# Patient Record
Sex: FEMALE | Race: WHITE | Employment: UNEMPLOYED | URBAN - METROPOLITAN AREA
[De-identification: names, ages, dates, MRNs, and addresses within clinical notes are randomized per-mention and may not be internally consistent; named-entity substitution may affect disease eponyms.]

---

## 2018-06-27 DIAGNOSIS — Z12.39 BREAST SCREENING: Primary | ICD-10-CM

## 2018-09-21 DIAGNOSIS — Z12.39 BREAST SCREENING: ICD-10-CM

## 2018-09-21 NOTE — LETTER
[address] [place] [address] [address]  [place], [facility]  Phone: [phone]  Fax: [phone]    Dear Vera Jeffries,    The results of the following test(s) you had done  are as follows and requires follow up as indicated.         Within Normal Limits   Further Action     Annual Follow Up      As Directed         Pap Smear:     []         []        Mammogram:             [x]          []   Endometrial Biopsy:              []                                            []   Cervical Biopsy:                     []                                            []   Dexascan:                               []                                            []                    Other:            Instructions for further action:         Even if findings are within normal limits now, it is important to continue annual visits with your doctor for regular screenings and exam.    Thank You,     Dr. Karen Rachel

## 2022-05-16 ENCOUNTER — APPOINTMENT (OUTPATIENT)
Dept: CT IMAGING | Age: 57
End: 2022-05-16
Payer: COMMERCIAL

## 2022-05-16 ENCOUNTER — HOSPITAL ENCOUNTER (EMERGENCY)
Age: 57
Discharge: HOME | End: 2022-05-16
Attending: EMERGENCY MEDICINE
Payer: COMMERCIAL

## 2022-05-16 VITALS
TEMPERATURE: 98.2 F | DIASTOLIC BLOOD PRESSURE: 89 MMHG | HEART RATE: 87 BPM | OXYGEN SATURATION: 99 % | BODY MASS INDEX: 28.35 KG/M2 | HEIGHT: 63 IN | SYSTOLIC BLOOD PRESSURE: 121 MMHG | WEIGHT: 160 LBS | RESPIRATION RATE: 15 BRPM

## 2022-05-16 DIAGNOSIS — M54.2 NECK PAIN: Primary | ICD-10-CM

## 2022-05-16 PROCEDURE — 72125 CT NECK SPINE W/O DYE: CPT

## 2022-05-16 PROCEDURE — 72131 CT LUMBAR SPINE W/O DYE: CPT

## 2022-05-16 PROCEDURE — 99284 EMERGENCY DEPT VISIT MOD MDM: CPT

## 2022-05-16 PROCEDURE — 6370000000 HC RX 637 (ALT 250 FOR IP): Performed by: PHYSICIAN ASSISTANT

## 2022-05-16 RX ORDER — CYCLOBENZAPRINE HCL 10 MG
10 TABLET ORAL 2 TIMES DAILY PRN
Qty: 14 TABLET | Refills: 0 | Status: SHIPPED | OUTPATIENT
Start: 2022-05-16 | End: 2022-05-23

## 2022-05-16 RX ORDER — TRAMADOL HYDROCHLORIDE 50 MG/1
50 TABLET ORAL EVERY 8 HOURS PRN
Qty: 9 TABLET | Refills: 0 | Status: SHIPPED | OUTPATIENT
Start: 2022-05-16 | End: 2022-05-19

## 2022-05-16 RX ORDER — CYCLOBENZAPRINE HCL 10 MG
10 TABLET ORAL ONCE
Status: COMPLETED | OUTPATIENT
Start: 2022-05-16 | End: 2022-05-16

## 2022-05-16 RX ORDER — PREDNISONE 20 MG/1
20 TABLET ORAL ONCE
Status: DISCONTINUED | OUTPATIENT
Start: 2022-05-16 | End: 2022-05-16

## 2022-05-16 RX ORDER — TRAMADOL HYDROCHLORIDE 50 MG/1
50 TABLET ORAL ONCE
Status: COMPLETED | OUTPATIENT
Start: 2022-05-16 | End: 2022-05-16

## 2022-05-16 RX ADMIN — CYCLOBENZAPRINE 10 MG: 10 TABLET, FILM COATED ORAL at 15:08

## 2022-05-16 RX ADMIN — TRAMADOL HYDROCHLORIDE 50 MG: 50 TABLET, COATED ORAL at 15:08

## 2022-05-16 ASSESSMENT — ENCOUNTER SYMPTOMS
BOWEL INCONTINENCE: 0
BACK PAIN: 1

## 2022-05-16 ASSESSMENT — PAIN SCALES - GENERAL: PAINLEVEL_OUTOF10: 7

## 2022-05-16 ASSESSMENT — PAIN - FUNCTIONAL ASSESSMENT: PAIN_FUNCTIONAL_ASSESSMENT: 0-10

## 2022-05-16 NOTE — ED NOTES
Pt to er with c/o neck and back pain. Pt states she started PT 2 months ago for neck pain and numbness to right hand/arm. Pt states they used traction at therapy and made her pain worse. Pt a&ox3. Skin warm and dry. Respirations even and non-labored.       Rufino Klinefelter, RN  05/16/22 4417

## 2022-05-16 NOTE — ED PROVIDER NOTES
eMERGENCY dEPARTMENT eNCOUnter   [address] Name: Pietro Aguiar  MRN: [id number]  [place] 1965  Date of evaluation: 5/16/22     Pietro Aguiar is a 65 y.o. female with CC: Back Pain (Hx of lumbar fusions; reports she started PT a few months ago for arm numbness; pt reports since started PT, pain in arm, back, and neck pain worsening ) and Neck Pain        This visit was performed by both a physician and an APC. I performed all aspects of the MDM as documented.       The care is provided during an unprecedented national emergency due to the novel coronavirus, Luis Chanel MD  Attending Emergency Physician           Lurdes Talley MD  05/16/22 7269

## 2022-05-16 NOTE — ED PROVIDER NOTES
for weakness. All other systems reviewed and are negative. 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[facility] [address]     Fibroid uterus     Graves disease     Menorrhagia        SURGICAL HISTORY       Past Surgical History:   Procedure Laterality Date     SECTION, LOW TRANSVERSE      HC  PICC POWERPIC SINGLE  2016         HYSTERECTOMY, TOTAL ABDOMINAL  6/28/10    TOTAL THYROIDECTOMY  2015       CURRENT MEDICATIONS       Previous Medications    ALPRAZOLAM (XANAX) 0.5 MG TABLET    Take 0.5 mg by mouth nightly as needed for Sleep    ASCORBIC ACID (VITAMIN C) 500 MG TABLET    Take 500 mg by mouth daily    LEVOTHYROXINE SODIUM (TIROSINT) 125 MCG CAPS    Take by mouth Daily    LIOTHYRONINE (CYTOMEL) 5 MCG TABLET    Take 5 mcg by mouth daily    MULTIPLE VITAMINS-MINERALS (MULTIVITAMIN PO)    Take by mouth       ALLERGIES     is allergic to codeine and other. FAMILY HISTORY     She indicated that the status of her mother is unknown. She indicated that the status of her father is unknown. SOCIAL HISTORY      reports that she has quit smoking. She has a 20.00 pack-year smoking history. She has never used smokeless tobacco. She reports current alcohol use. She reports that she does not use drugs. PHYSICAL EXAM     INITIAL VITALS: /89   Pulse 87   Temp 98.2 °F (36.8 °C)   Resp 15   Ht 5' 3\" (1.6 m)   Wt 160 lb (72.6 kg)   SpO2 99%   BMI 28.34 kg/m²      Physical Exam  Vitals and nursing note reviewed. Constitutional:       Appearance: She is well-developed. HENT:      Head: Normocephalic and atraumatic. Eyes:      Pupils: Pupils are equal, round, and reactive to light. Cardiovascular:      Rate and Rhythm: Normal rate and regular rhythm. Heart sounds: Normal heart sounds. No murmur heard. Pulmonary:      Effort: Pulmonary effort is normal.      Breath sounds: Normal breath sounds.    Abdominal:      General: Bowel sounds are normal.      Palpations: Abdomen is soft. Tenderness: There is no abdominal tenderness. Musculoskeletal:         General: Normal range of motion. Cervical back: Normal range of motion. Skin:     General: Skin is warm and dry. Neurological:      General: No focal deficit present. Mental Status: She is alert and oriented to person, place, and time. Cranial Nerves: No cranial nerve deficit. Sensory: No sensory deficit. Motor: No weakness. Coordination: Coordination normal.      Gait: Gait normal.      Deep Tendon Reflexes: Reflexes normal.         MEDICAL DECISION MAKING:     There are chronic changes noted on the cervical spine CT. Chronic changes noted on the lumbar spine CT. offered steroids but she states that she does not tolerate these very well. Recommend ice or heat to affected area close follow-up with neurosurgery of your choice will provide the name for Dr. Harden Apgar for further evaluation and treatment. If symptoms worsen please return to the emergency department. DIAGNOSTIC RESULTS     EKG: All EKG's are interpreted by the Emergency Department Physician who either signs or Co-signs this chart in the absence of acardiologist.        RADIOLOGY:[place]plain film, CT, MRI, and formal ultrasound images (except ED bedside ultrasound) are read by the radiologist and the images and interpretations are directly viewed by the emergency physician. LABS:All lab results were reviewed by myself, and all abnormals are listed below.   Labs Reviewed - No data to display      EMERGENCY DEPARTMENT COURSE:   Vitals:    Vitals:    22 1405   BP: 121/89   Pulse: 87   Resp: 15   Temp: 98.2 °F (36.8 °C)   SpO2: 99%   Weight: 160 lb (72.6 kg)   Height: 5' 3\" (1.6 m)       The patient was given the following medications while in the emergency department:  Orders Placed This Encounter   Medications    traMADol (ULTRAM) tablet 50 mg    DISCONTD: predniSONE (DELTASONE) tablet 20 mg    cyclobenzaprine (FLEXERIL) tablet 10 mg    traMADol (ULTRAM) 50 MG tablet     Sig: Take 1 tablet by mouth every 8 hours as needed for Pain for up to 3 days. Intended supply: 3 days. Take lowest dose possible to manage pain     Dispense:  9 tablet     Refill:  0    cyclobenzaprine (FLEXERIL) 10 MG tablet     Sig: Take 1 tablet by mouth 2 times daily as needed for Muscle spasms     Dispense:  14 tablet     Refill:  0       -------------------------      CRITICAL CARE:    CONSULTS:  None    PROCEDURES:  Procedures    FINAL IMPRESSION      1. Neck pain          DISPOSITION/PLAN   DISPOSITION Decision To Discharge 05/16/2022 05:03:33 PM      PATIENT REFERREDTO:  Leopold Havens, MD  [address] [facility]  [phone]    Schedule an appointment as soon as possible for a visit in 2 days      [facility] ED  [address] [facility]  [phone]    If symptoms worsen      DISCHARGEMEDICATIONS:  New Prescriptions    CYCLOBENZAPRINE (FLEXERIL) 10 MG TABLET    Take 1 tablet by mouth 2 times daily as needed for Muscle spasms    TRAMADOL (ULTRAM) 50 MG TABLET    Take 1 tablet by mouth every 8 hours as needed for Pain for up to 3 days. Intended supply: 3 days.  Take lowest dose possible to manage pain       (Please note that portions of this note were completed with a voice recognition program.  Efforts were made to edit thedictations but occasionally words are mis-transcribed.)    TRACEY Aragon PA-C  05/16/22 4503

## 2022-05-18 ENCOUNTER — TELEPHONE (OUTPATIENT)
Dept: ADMINISTRATIVE | Age: 57
End: 2022-05-18

## 2022-05-18 NOTE — TELEPHONE ENCOUNTER
The Pt called requesting to see Dr Kevin Medina. She was in the hospital for back issues on Mon. 5/16/22-[facility]. She also has a referral from her PCP. The Pt is requesting a Call Back this afternoon. (If possible) The Pt can be reached at [phone].

## 2022-05-23 ENCOUNTER — TELEPHONE (OUTPATIENT)
Dept: PAIN MANAGEMENT | Age: 57
End: 2022-05-23

## 2022-05-23 ENCOUNTER — INITIAL CONSULT (OUTPATIENT)
Dept: PAIN MANAGEMENT | Age: 57
End: 2022-05-23
Payer: COMMERCIAL

## 2022-05-23 VITALS — WEIGHT: 178.6 LBS | BODY MASS INDEX: 31.64 KG/M2 | HEIGHT: 63 IN

## 2022-05-23 DIAGNOSIS — M54.12 CERVICAL RADICULOPATHY: ICD-10-CM

## 2022-05-23 DIAGNOSIS — R93.7 ABNORMAL MRI, CERVICAL SPINE: ICD-10-CM

## 2022-05-23 DIAGNOSIS — M96.1 POSTLAMINECTOMY SYNDROME OF LUMBAR REGION: Primary | ICD-10-CM

## 2022-05-23 DIAGNOSIS — G89.29 OTHER CHRONIC PAIN: ICD-10-CM

## 2022-05-23 PROCEDURE — 1036F TOBACCO NON-USER: CPT | Performed by: PAIN MEDICINE

## 2022-05-23 PROCEDURE — G8427 DOCREV CUR MEDS BY ELIG CLIN: HCPCS | Performed by: PAIN MEDICINE

## 2022-05-23 PROCEDURE — 3017F COLORECTAL CA SCREEN DOC REV: CPT | Performed by: PAIN MEDICINE

## 2022-05-23 PROCEDURE — G8417 CALC BMI ABV UP PARAM F/U: HCPCS | Performed by: PAIN MEDICINE

## 2022-05-23 PROCEDURE — 99204 OFFICE O/P NEW MOD 45 MIN: CPT | Performed by: PAIN MEDICINE

## 2022-05-23 RX ORDER — LEVOTHYROXINE, LIOTHYRONINE 57; 13.5 UG/1; UG/1
TABLET ORAL
COMMUNITY
Start: 2022-03-26

## 2022-05-23 RX ORDER — PANCRELIPASE 30000; 6000; 19000 [USP'U]/1; [USP'U]/1; [USP'U]/1
CAPSULE, DELAYED RELEASE PELLETS ORAL
COMMUNITY
Start: 2022-04-24

## 2022-05-23 RX ORDER — METOPROLOL SUCCINATE 25 MG/1
TABLET, EXTENDED RELEASE ORAL
COMMUNITY
Start: 2022-02-15

## 2022-05-23 RX ORDER — PROGESTERONE 100 MG/1
CAPSULE ORAL DAILY
COMMUNITY

## 2022-05-23 ASSESSMENT — ENCOUNTER SYMPTOMS
BOWEL INCONTINENCE: 0
BACK PAIN: 1

## 2022-05-23 NOTE — PROGRESS NOTES
HPI:     Back Pain  This is a chronic problem. The current episode started more than 1 year ago. The problem occurs intermittently. The problem has been gradually worsening since onset. The pain is present in the lumbar spine. The quality of the pain is described as aching, burning, cramping, shooting and stabbing. The pain does not radiate. The pain is at a severity of 6/10. The symptoms are aggravated by bending, twisting and sitting. Associated symptoms include bladder incontinence, headaches and numbness. Pertinent negatives include no bowel incontinence, tingling or weakness. Risk factors include history of cancer (Adenoid cystic carcinoma). Treatments tried: pt, surgery. Neck Pain   This is a chronic problem. The current episode started more than 1 year ago. The problem occurs constantly. The problem has been gradually worsening. The pain is associated with nothing. The pain is present in the left side and occipital region. The quality of the pain is described as stabbing and aching. The pain is at a severity of 10/10. The symptoms are aggravated by twisting, bending, coughing, sneezing and swallowing. Associated symptoms include headaches and numbness. Pertinent negatives include no tingling or weakness. Chronic neck and low back pain. Pain has been getting significantly worse lately. She has done physical therapy which she feels made things much worse. Pain has gotten so severe that she went to the emergency department last week. CT of the lumbar spine shows previous lumbar fusion and degenerative changes. CT of the cervical spine shows multilevel degenerative changes. Complaining of worsening subjective weakness in the right arm. OARRS reviewed, medical marijuana, unsure of benefit. Chart review shows a history of etoh abuse. Patient denies any new neurological symptoms. No bowel or bladder incontinence, no weakness, and no falling.     Review of OARRS does not show any aberrant prescription behavior. 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[facility] [address]     Fibroid uterus     Graves disease     Menorrhagia        Past Surgical History:   Procedure Laterality Date     SECTION, LOW TRANSVERSE      HC  PICC POWERPIC SINGLE  2016         HYSTERECTOMY, TOTAL ABDOMINAL  6/28/10    TOTAL THYROIDECTOMY  2015       Allergies   Allergen Reactions    Codeine     Other      ALL NARCOTICS    Penicillin G Other (See Comments)         Current Outpatient Medications:     NP THYROID 90 MG tablet, , Disp: , Rfl:     metoprolol succinate (TOPROL XL) 25 MG extended release tablet, , Disp: , Rfl:     CREON 6000-44006 units delayed release capsule, , Disp: , Rfl:     progesterone (PROMETRIUM) 100 MG CAPS capsule, Take by mouth daily, Disp: , Rfl:     cyclobenzaprine (FLEXERIL) 10 MG tablet, Take 1 tablet by mouth 2 times daily as needed for Muscle spasms, Disp: 14 tablet, Rfl: 0    Levothyroxine Sodium (TIROSINT) 125 MCG CAPS, Take by mouth Daily, Disp: , Rfl:     liothyronine (CYTOMEL) 5 MCG tablet, Take 5 mcg by mouth daily, Disp: , Rfl:     Multiple Vitamins-Minerals (MULTIVITAMIN PO), Take by mouth, Disp: , Rfl:     Ascorbic Acid (VITAMIN C) 500 MG tablet, Take 500 mg by mouth daily, Disp: , Rfl:     ALPRAZolam (XANAX) 0.5 MG tablet, Take 0.5 mg by mouth nightly as needed for Sleep, Disp: , Rfl:     Family History   Problem Relation Age of Onset    Schizophrenia Mother     Heart Disease Father        Social History     Socioeconomic History    Marital status:      Spouse name: Not on file    Number of children: Not on file    Years of education: Not on file    Highest education level: Not on file   Occupational History    Not on file   Tobacco Use    Smoking status: Former Smoker     Packs/day: 1.00     Years: 20.00     Pack years: 20.00    Smokeless tobacco: Never Used   Substance and Sexual Activity    Alcohol use:  Yes     Alcohol/week: 0.0 standard drinks     Comment: social    Drug use: No    Sexual activity: Yes   Other Topics Concern    Not on file   Social History Narrative    Not on file     Social Determinants of Health     Financial Resource Strain:     Difficulty of Paying Living Expenses: Not on file   Food Insecurity:     Worried About Running Out of Food in the Last Year: Not on file    Kari of Food in the Last Year: Not on file   Transportation Needs:     Lack of Transportation (Medical): Not on file    Lack of Transportation (Non-Medical): Not on file   Physical Activity:     Days of Exercise per Week: Not on file    Minutes of Exercise per Session: Not on file   Stress:     Feeling of Stress : Not on file   Social Connections:     Frequency of Communication with Friends and Family: Not on file    Frequency of Social Gatherings with Friends and Family: Not on file    Attends [employer] Services: Not on file    Active Member of [address] [place] [address] or Organizations: Not on file    Attends Club or Organization Meetings: Not on file    Marital Status: Not on file   Intimate Partner Violence:     Fear of Current or Ex-Partner: Not on file    Emotionally Abused: Not on file    Physically Abused: Not on file    Sexually Abused: Not on file   Housing Stability:     Unable to Pay for Housing in the Last Year: Not on file    Number of [place] in the Last Year: Not on file    Unstable Housing in the Last Year: Not on file       Review of Systems:  Review of Systems   Musculoskeletal: Positive for back pain and neck pain. Gastrointestinal: Negative for bowel incontinence. Genitourinary: Positive for bladder incontinence. Neurological: Positive for headaches and numbness. Negative for tingling and weakness. Physical Exam:  Ht 5' 3\" (1.6 m)   Wt 178 lb 9.6 oz (81 kg)   BMI 31.64 kg/m²     Physical Exam  Constitutional:       Appearance: Normal appearance.    Pulmonary:      Effort: Pulmonary effort is normal. Neurological:      Mental Status: She is alert. Psychiatric:         Attention and Perception: Attention and perception normal.         Mood and Affect: Mood and affect normal.         Record/Diagnostics Review:    As above, I did independently review the imaging      Assessment:  1. Postlaminectomy syndrome of lumbar region    2. Cervical radiculopathy    3. Other chronic pain        Treatment Plan:  DISCUSSION: Treatment options discussed with patient and all questions answered to patient's satisfaction. OARRS Review: Reviewed and acceptable for medications prescribed. TREATMENT OPTIONS:     Discussed different treatment options including continued conservative care such as physical therapy, chiropractic care, acupuncture. Discussed different interventional options such as epidural steroids or medial branch blocks. Also discussed neuromodulation in the form of spinal cord stimulation. Also discussed surgical evaluation. Has failed conservative measures, including physical therapy and the pain is worsening, I do believe an MRI of the Cervical and Lumbar spine is indicated to further evaluate pathology and guide treatment plan. Consider injection therapies versus surgical evaluation based on imaging results. Chart review shows h/o etoh abuse, states she stopped drinking in January of this year. Mayank Bermudez M.D. I have reviewed the chief complaint and history of present illness (including ROS and PFSH) and vital documentation by my staff and I agree with their documentation and have added where applicable.

## 2022-05-23 NOTE — TELEPHONE ENCOUNTER
Pt called and stated she scheduled her MRI for June 2nd. Pt wanted to be seen the same day with provider for a follow up. Writer offered pt first available and pt stated she needs to be seen much sooner. Pt stated office told her she could be fit in for an appt after MRI. Writer unable to reach clinical staff. Please contact pt.

## 2022-06-03 ENCOUNTER — HOSPITAL ENCOUNTER (OUTPATIENT)
Dept: MRI IMAGING | Facility: CLINIC | Age: 57
Discharge: HOME | End: 2022-06-05
Payer: COMMERCIAL

## 2022-06-03 DIAGNOSIS — M96.1 POSTLAMINECTOMY SYNDROME OF LUMBAR REGION: ICD-10-CM

## 2022-06-03 DIAGNOSIS — M54.12 CERVICAL RADICULOPATHY: ICD-10-CM

## 2022-06-03 LAB — POC CREATININE: 0.6 MG/DL (ref 0.6–1.4)

## 2022-06-03 PROCEDURE — A9579 GAD-BASE MR CONTRAST NOS,1ML: HCPCS | Performed by: PAIN MEDICINE

## 2022-06-03 PROCEDURE — 72158 MRI LUMBAR SPINE W/O & W/DYE: CPT

## 2022-06-03 PROCEDURE — 6360000004 HC RX CONTRAST MEDICATION: Performed by: PAIN MEDICINE

## 2022-06-03 PROCEDURE — 82565 ASSAY OF CREATININE: CPT

## 2022-06-03 PROCEDURE — 72141 MRI NECK SPINE W/O DYE: CPT

## 2022-06-03 RX ADMIN — GADOTERIDOL 20 ML: 279.3 INJECTION, SOLUTION INTRAVENOUS at 11:47

## 2022-06-09 ENCOUNTER — OFFICE VISIT (OUTPATIENT)
Dept: PAIN MANAGEMENT | Age: 57
End: 2022-06-09
Payer: COMMERCIAL

## 2022-06-09 VITALS — BODY MASS INDEX: 28.87 KG/M2 | WEIGHT: 163 LBS

## 2022-06-09 DIAGNOSIS — M51.36 DDD (DEGENERATIVE DISC DISEASE), LUMBAR: Chronic | ICD-10-CM

## 2022-06-09 DIAGNOSIS — M50.30 DDD (DEGENERATIVE DISC DISEASE), CERVICAL: Chronic | ICD-10-CM

## 2022-06-09 DIAGNOSIS — M48.02 CERVICAL STENOSIS OF SPINE: ICD-10-CM

## 2022-06-09 PROCEDURE — 99213 OFFICE O/P EST LOW 20 MIN: CPT | Performed by: NURSE PRACTITIONER

## 2022-06-09 ASSESSMENT — ENCOUNTER SYMPTOMS
PHOTOPHOBIA: 0
TROUBLE SWALLOWING: 0
BACK PAIN: 1
BOWEL INCONTINENCE: 0
VISUAL CHANGE: 0
ABDOMINAL PAIN: 0

## 2022-06-09 NOTE — PROGRESS NOTES
Chief Complaint   Patient presents with    Back Pain    Neck Pain         [facility]  Pt complains of neck and back pain. Cervical MRI with multilevel degenerative changes with moderate to severe spinal canal stenosis at C5-6 and C6-7 and cord changes at C5-6. Complains of subjective weakness in right arm and numbness and tingling in right fingers. Lumbar MRI with multilevel degenerative changes as well. She uses medical marijuana for pain. History of alcohol abuse. Back Pain  This is a chronic problem. The current episode started more than 1 year ago. The problem occurs constantly. The problem has been gradually worsening since onset. The pain is present in the lumbar spine. The quality of the pain is described as aching and stabbing. The pain does not radiate. The pain is at a severity of 3/10. The pain is moderate. The pain is worse during the day. The symptoms are aggravated by bending, lying down, position, sitting, standing, stress and twisting. Associated symptoms include headaches, numbness, tingling and weakness. Pertinent negatives include no abdominal pain, bladder incontinence, bowel incontinence, chest pain, dysuria, fever, leg pain, paresis, paresthesias, pelvic pain, perianal numbness or weight loss. Risk factors include history of cancer. She has tried analgesics, NSAIDs, bed rest, heat, ice, walking, muscle relaxant and home exercises for the symptoms. The treatment provided mild relief. Neck Pain   This is a chronic problem. The current episode started more than 1 year ago. The problem occurs constantly. The problem has been gradually worsening. The pain is present in the left side. The quality of the pain is described as shooting, aching and stabbing. The pain is at a severity of 3/10. The pain is moderate. The symptoms are aggravated by bending, coughing, position, sneezing, stress, swallowing and twisting. The pain is same all the time. Stiffness is present in the morning.  Associated symptoms include headaches, numbness, tingling and weakness. Pertinent negatives include no chest pain, fever, leg pain, pain with swallowing, paresis, photophobia, syncope, trouble swallowing, visual change or weight loss. She has tried acetaminophen, bed rest, chiropractic manipulation, heat, home exercises, ice, muscle relaxants, neck support, NSAIDs and oral narcotics for the symptoms. The treatment provided mild relief. Patient denies any new neurological symptoms. No bowel or bladder incontinence, no weakness, and no falling.     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[address]     Fibroid uterus     Graves disease     Menorrhagia        Past Surgical History:   Procedure Laterality Date     SECTION, LOW TRANSVERSE      HC  PICC POWERPIC SINGLE  2016         TOTAL ABDOMINAL HYSTERECTOMY  6/28/10    TOTAL THYROIDECTOMY  2015       Allergies   Allergen Reactions    Codeine     Other      ALL NARCOTICS    Penicillin G Other (See Comments)         Current Outpatient Medications:     NP THYROID 90 MG tablet, , Disp: , Rfl:     metoprolol succinate (TOPROL XL) 25 MG extended release tablet, , Disp: , Rfl:     CREON 6000-04851 units delayed release capsule, , Disp: , Rfl:     progesterone (PROMETRIUM) 100 MG CAPS capsule, Take by mouth daily, Disp: , Rfl:     Levothyroxine Sodium (TIROSINT) 125 MCG CAPS, Take by mouth Daily, Disp: , Rfl:     liothyronine (CYTOMEL) 5 MCG tablet, Take 5 mcg by mouth daily, Disp: , Rfl:     Multiple Vitamins-Minerals (MULTIVITAMIN PO), Take by mouth, Disp: , Rfl:     Ascorbic Acid (VITAMIN C) 500 MG tablet, Take 500 mg by mouth daily, Disp: , Rfl:     ALPRAZolam (XANAX) 0.5 MG tablet, Take 0.5 mg by mouth nightly as needed for Sleep, Disp: , Rfl:     Family History   Problem Relation Age of Onset    Schizophrenia Mother     Heart Disease Father        Social History     Socioeconomic History    Marital status:      Spouse name: Not on file    Number of children: Not on file    Years of education: Not on file    Highest education level: Not on file   Occupational History    Not on file   Tobacco Use    Smoking status: Former Smoker     Packs/day: 1.00     Years: 20.00     Pack years: 20.00    Smokeless tobacco: Never Used   Substance and Sexual Activity    Alcohol use: Yes     Alcohol/week: 0.0 standard drinks     Comment: social    Drug use: No    Sexual activity: Yes   Other Topics Concern    Not on file   Social History Narrative    Not on file     Social Determinants of Health     Financial Resource Strain:     Difficulty of Paying Living Expenses: Not on file   Food Insecurity:     Worried About Running Out of Food in the Last Year: Not on file    Kari of Food in the Last Year: Not on file   Transportation Needs:     Lack of Transportation (Medical): Not on file    Lack of Transportation (Non-Medical): Not on file   Physical Activity:     Days of Exercise per Week: Not on file    Minutes of Exercise per Session: Not on file   Stress:     Feeling of Stress : Not on file   Social Connections:     Frequency of Communication with Friends and Family: Not on file    Frequency of Social Gatherings with Friends and Family: Not on file    Attends Holiness Services: Not on file    Active Member of [address] [place] [address] [employer] or Organizations: Not on file    Attends Club or Organization Meetings: Not on file    Marital Status: Not on file   Intimate Partner Violence:     Fear of Current or Ex-Partner: Not on file    Emotionally Abused: Not on file    Physically Abused: Not on file    Sexually Abused: Not on file   Housing Stability:     Unable to Pay for Housing in the Last Year: Not on file    Number of [place] in the Last Year: Not on file    Unstable Housing in the Last Year: Not on file       Review of Systems:  Review of Systems   Constitutional: Negative for fever and weight loss.    HENT: Negative for trouble swallowing. Eyes: Negative for photophobia. Cardiovascular: Negative for chest pain, palpitations and syncope. Musculoskeletal: Positive for back pain and neck pain. Gastrointestinal: Negative for abdominal pain and bowel incontinence. Genitourinary: Negative for bladder incontinence, dysuria and pelvic pain. Neurological: Positive for headaches, numbness, tingling and weakness. Negative for disturbances in coordination, loss of balance and paresthesias. Physical Exam:  Wt 163 lb (73.9 kg)   BMI 28.87 kg/m²     Physical Exam  HENT:      Head: Normocephalic. Pulmonary:      Effort: Pulmonary effort is normal.   Musculoskeletal:         General: Normal range of motion. Cervical back: Normal range of motion. Tenderness present. Lumbar back: Tenderness present. Back:    Skin:     General: Skin is warm and dry. Neurological:      Mental Status: She is alert and oriented to person, place, and time. Record/Diagnostics Review:    FINDINGS:   BONES/ALIGNMENT: There is straightening of the normal cervical lordosis.  No   significant listhesis.  Vertebral body heights are maintained.  There is   edema at C3 and C4.       SPINAL CORD: Faint T2 hyperintensity in the cervical cord at C5-6.       SOFT TISSUES: No paraspinal mass identified.       C2-C3: There is no significant disc protrusion, spinal canal stenosis or   neural foraminal narrowing.       C3-C4: Posterior disc osteophyte complex with moderate spinal canal stenosis.    Uncovertebral facet DJD with moderate bilateral neural foraminal stenosis.       C4-C5: Posterior disc osteophyte complex without significant spinal canal   stenosis.  Mild bilateral neural foraminal narrowing.       C5-C6: Posterior disc osteophyte complex and ligamentum flavum hypertrophy   with moderate to severe spinal canal stenosis.  Uncovertebral facet DJD with   moderate bilateral neural foraminal stenosis.       C6-C7: Posterior disc osteophyte complex and ligamentum flavum hypertrophy   with moderate to severe spinal canal stenosis.  Uncovertebral facet DJD with   severe left and moderate right neural foraminal stenosis.       C7-T1: Posterior disc osteophyte complex without significant spinal canal or   right neural foraminal stenosis.  Moderate left neural foraminal narrowing.           Impression   1. Multilevel degenerative change with moderate to severe spinal canal   stenosis at C5-6 and C6-7.  There is moderate spinal canal narrowing at C3-4.   2. Multilevel neural foraminal narrowing as above. 3. Edema at C3-4, which may represent Modic type 1 degenerative change. 4. T2 hyperintensity in the cervical cord at C5-6, which may represent   myelomalacia. 5. Straightening of the normal cervical lordosis. FINDINGS:   BONES/ALIGNMENT: There are 5 lumbar vertebral bodies.  There is   anterolisthesis at L3-4.  There is retrolisthesis at L2-3.  Vertebral body   heights are maintained.  Postoperative changes at L4-5 with associated   hardware artifact.  There is edema at L1-2 and T11 likely Modic type 1   degenerative change.       SPINAL CORD:  The conus terminates normally.       SOFT TISSUES: No abnormal enhancement is seen of the lumbar spine.  No   paraspinal mass identified.       L1-L2: There is no significant disc protrusion, spinal canal stenosis or   neural foraminal narrowing.       L2-L3: Disc protrusion and facet DJD with minimal spinal canal stenosis. Moderate bilateral neural foraminal stenosis.       L3-L4: Disc protrusion and facet DJD with mild spinal canal stenosis. Moderate bilateral neural foraminal stenosis.       L4-L5: Posterior decompression without significant spinal canal or neural   foraminal stenosis.       L5-S1: Disc protrusion and facet DJD with mild spinal canal stenosis. Moderate bilateral neural foraminal stenosis.         Impression   1.  Prior posterior decompression at L4-5 without significant spinal canal   stenosis. 2. Multilevel degenerative change with mild spinal canal narrowing at L2-3,   L3-4, and L5-S1.   3. Multilevel neural foraminal narrowing. 4. Anterolisthesis at L3-4 with retrolisthesis at L2-3. Assessment:  Problem List Items Addressed This Visit     DDD (degenerative disc disease), lumbar (Chronic)    DDD (degenerative disc disease), cervical (Chronic)    Cervical stenosis of spine             Treatment Plan:  MRIs reviewed with patient and her   Referral to Dr. Geena Raya already placed - copy given to patient  Follow up after she sees surgeon  HX of infection after back surgery requiring a PICC line  No longer following with ID       I have reviewed the chief complaint and history of present illness (including ROS and PFSH) and vital documentation by my staff and I agree with their documentation and have added where applicable.

## 2022-06-10 PROBLEM — M51.36 DDD (DEGENERATIVE DISC DISEASE), LUMBAR: Chronic | Status: ACTIVE | Noted: 2022-06-10

## 2022-06-10 PROBLEM — M50.30 DDD (DEGENERATIVE DISC DISEASE), CERVICAL: Chronic | Status: ACTIVE | Noted: 2022-06-10

## 2022-06-10 PROBLEM — M48.02 CERVICAL STENOSIS OF SPINE: Status: ACTIVE | Noted: 2022-06-10

## 2022-06-13 ENCOUNTER — HOSPITAL ENCOUNTER (EMERGENCY)
Age: 57
Discharge: HOME | End: 2022-06-13
Attending: EMERGENCY MEDICINE
Payer: COMMERCIAL

## 2022-06-13 VITALS
OXYGEN SATURATION: 97 % | DIASTOLIC BLOOD PRESSURE: 97 MMHG | TEMPERATURE: 98.4 F | SYSTOLIC BLOOD PRESSURE: 129 MMHG | RESPIRATION RATE: 16 BRPM | HEART RATE: 70 BPM

## 2022-06-13 DIAGNOSIS — M79.601 PARESTHESIA AND PAIN OF BOTH UPPER EXTREMITIES: Primary | ICD-10-CM

## 2022-06-13 DIAGNOSIS — R20.2 PARESTHESIA AND PAIN OF BOTH UPPER EXTREMITIES: Primary | ICD-10-CM

## 2022-06-13 DIAGNOSIS — M79.602 PARESTHESIA AND PAIN OF BOTH UPPER EXTREMITIES: Primary | ICD-10-CM

## 2022-06-13 LAB
ABSOLUTE EOS #: 0.19 K/UL (ref 0–0.44)
ABSOLUTE IMMATURE GRANULOCYTE: <0.03 K/UL (ref 0–0.3)
ABSOLUTE LYMPH #: 2.49 K/UL (ref 1.1–3.7)
ABSOLUTE MONO #: 0.68 K/UL (ref 0.1–1.2)
ANION GAP SERPL CALCULATED.3IONS-SCNC: 15 MMOL/L (ref 9–17)
BASOPHILS # BLD: 1 % (ref 0–2)
BASOPHILS ABSOLUTE: 0.05 K/UL (ref 0–0.2)
BUN BLDV-MCNC: 12 MG/DL (ref 6–20)
CALCIUM SERPL-MCNC: 9.9 MG/DL (ref 8.6–10.4)
CHLORIDE BLD-SCNC: 96 MMOL/L (ref 98–107)
CO2: 25 MMOL/L (ref 20–31)
CREAT SERPL-MCNC: 0.5 MG/DL (ref 0.5–0.9)
EOSINOPHILS RELATIVE PERCENT: 3 % (ref 1–4)
GFR AFRICAN AMERICAN: >60 ML/MIN
GFR NON-AFRICAN AMERICAN: >60 ML/MIN
GFR SERPL CREATININE-BSD FRML MDRD: ABNORMAL ML/MIN/{1.73_M2}
GLUCOSE BLD-MCNC: 118 MG/DL (ref 70–99)
HCT VFR BLD CALC: 43.4 % (ref 36.3–47.1)
HEMOGLOBIN: 14.7 G/DL (ref 11.9–15.1)
IMMATURE GRANULOCYTES: 0 %
LYMPHOCYTES # BLD: 36 % (ref 24–43)
MCH RBC QN AUTO: 31.2 PG (ref 25.2–33.5)
MCHC RBC AUTO-ENTMCNC: 33.9 G/DL (ref 28.4–34.8)
MCV RBC AUTO: 92.1 FL (ref 82.6–102.9)
MONOCYTES # BLD: 10 % (ref 3–12)
NRBC AUTOMATED: 0 PER 100 WBC
PDW BLD-RTO: 12.8 % (ref 11.8–14.4)
PLATELET # BLD: 254 K/UL (ref 138–453)
PMV BLD AUTO: 10.6 FL (ref 8.1–13.5)
POTASSIUM SERPL-SCNC: 4.4 MMOL/L (ref 3.7–5.3)
RBC # BLD: 4.71 M/UL (ref 3.95–5.11)
SEG NEUTROPHILS: 50 % (ref 36–65)
SEGMENTED NEUTROPHILS ABSOLUTE COUNT: 3.56 K/UL (ref 1.5–8.1)
SODIUM BLD-SCNC: 136 MMOL/L (ref 135–144)
TSH SERPL DL<=0.05 MIU/L-ACNC: 4.82 UIU/ML (ref 0.3–5)
WBC # BLD: 7 K/UL (ref 3.5–11.3)

## 2022-06-13 PROCEDURE — 93005 ELECTROCARDIOGRAM TRACING: CPT | Performed by: STUDENT IN AN ORGANIZED HEALTH CARE EDUCATION/TRAINING PROGRAM

## 2022-06-13 PROCEDURE — 99285 EMERGENCY DEPT VISIT HI MDM: CPT | Performed by: NEUROLOGICAL SURGERY

## 2022-06-13 PROCEDURE — 96375 TX/PRO/DX INJ NEW DRUG ADDON: CPT

## 2022-06-13 PROCEDURE — 99284 EMERGENCY DEPT VISIT MOD MDM: CPT

## 2022-06-13 PROCEDURE — 85025 COMPLETE CBC W/AUTO DIFF WBC: CPT

## 2022-06-13 PROCEDURE — 96374 THER/PROPH/DIAG INJ IV PUSH: CPT

## 2022-06-13 PROCEDURE — 84443 ASSAY THYROID STIM HORMONE: CPT

## 2022-06-13 PROCEDURE — 80048 BASIC METABOLIC PNL TOTAL CA: CPT

## 2022-06-13 PROCEDURE — 6360000002 HC RX W HCPCS: Performed by: STUDENT IN AN ORGANIZED HEALTH CARE EDUCATION/TRAINING PROGRAM

## 2022-06-13 RX ORDER — GABAPENTIN 100 MG/1
300 CAPSULE ORAL 3 TIMES DAILY
Qty: 270 CAPSULE | Refills: 5 | Status: SHIPPED | OUTPATIENT
Start: 2022-06-13 | End: 2022-07-28 | Stop reason: SDUPTHER

## 2022-06-13 RX ORDER — ONDANSETRON 2 MG/ML
4 INJECTION INTRAMUSCULAR; INTRAVENOUS ONCE
Status: COMPLETED | OUTPATIENT
Start: 2022-06-13 | End: 2022-06-13

## 2022-06-13 RX ORDER — FENTANYL CITRATE 50 UG/ML
50 INJECTION, SOLUTION INTRAMUSCULAR; INTRAVENOUS ONCE
Status: COMPLETED | OUTPATIENT
Start: 2022-06-13 | End: 2022-06-13

## 2022-06-13 RX ADMIN — FENTANYL CITRATE 50 MCG: 50 INJECTION, SOLUTION INTRAMUSCULAR; INTRAVENOUS at 12:19

## 2022-06-13 RX ADMIN — ONDANSETRON 4 MG: 2 INJECTION INTRAMUSCULAR; INTRAVENOUS at 12:19

## 2022-06-13 ASSESSMENT — PAIN DESCRIPTION - ORIENTATION: ORIENTATION: LEFT;RIGHT

## 2022-06-13 ASSESSMENT — ENCOUNTER SYMPTOMS
COUGH: 0
WHEEZING: 0
VOMITING: 0
NAUSEA: 0
BACK PAIN: 1
PHOTOPHOBIA: 0
ABDOMINAL PAIN: 0
SHORTNESS OF BREATH: 0

## 2022-06-13 ASSESSMENT — PAIN - FUNCTIONAL ASSESSMENT: PAIN_FUNCTIONAL_ASSESSMENT: 0-10

## 2022-06-13 ASSESSMENT — PAIN SCALES - GENERAL: PAINLEVEL_OUTOF10: 10

## 2022-06-13 ASSESSMENT — PAIN DESCRIPTION - DESCRIPTORS: DESCRIPTORS: TINGLING

## 2022-06-13 ASSESSMENT — PAIN DESCRIPTION - LOCATION: LOCATION: HAND

## 2022-06-13 NOTE — CONSULTS
Department of Neurosurgery                                         Resident Consult Note      Reason for Consult: Cervical Radiculopathy   Requesting Physician:   Neurosurgeon:     History Obtained From:  patient    CHIEF COMPLAINT:     Neck pain with arm weakness/numbness     HISTORY OF PRESENT ILLNESS:       The patient is a 65 y.o. female who presents to the ED with acute exacerbation of chronic neck pain with associated bilateral upper extremity weakness and numbness/tingling. The patient reports that she has had chronic neck pain for months and has been being treated by . She reports since Saturday she has had worsening pain with weakness and numbness/tingling primarily in her upper extremities. She states that prior to Saturday her symptoms primarily involved her neck and right upper extremity. On Saturday she noticed new symptoms in her LUE. She describes the symptoms as numbness/tingling or like \"hitting the funny bone\". She notes today this sensation has improved somewhat but she still has pain in her neck. She denies any preceding injury. She notes she has been going to PT for her neck with limited relief. She also notes weakness and difficulty with coordination of her bilateral upper extremities. She notes the numbness/tingling is not localized to an area of hand or upper extremity. She has no bowel or bladder incontinence. She denies any symptoms in her legs. She is currently taking motrin with mild relief. 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       PAST MEDICAL HISTORY :       Past Medical History:        Diagnosis Date    Ad[facility])     Fibroid uterus     Graves disease     Menorrhagia        Past Surgical History:        Procedure Laterality Date     SECTION, LOW TRANSVERSE      HC  PICC POWERPIC SINGLE  2016         HYSTERECTOMY, TOTAL ABDOMINAL (CERVIX REMOVED)  6/28/10    TOTAL THYROIDECTOMY  5/29/2015       Social History:   Social History     Socioeconomic History    Marital status:      Spouse name: Not on file    Number of children: Not on file    Years of education: Not on file    Highest education level: Not on file   Occupational History    Not on file   Tobacco Use    Smoking status: Former Smoker     Packs/day: 1.00     Years: 20.00     Pack years: 20.00    Smokeless tobacco: Never Used   Substance and Sexual Activity    Alcohol use: Yes     Alcohol/week: 0.0 standard drinks     Comment: social    Drug use: No    Sexual activity: Yes   Other Topics Concern    Not on file   Social History Narrative    Not on file     Social Determinants of Health     Financial Resource Strain:     Difficulty of Paying Living Expenses: Not on file   Food Insecurity:     Worried About Running Out of Food in the Last Year: Not on file    Kari of Food in the Last Year: Not on file   Transportation Needs:     Lack of Transportation (Medical): Not on file    Lack of Transportation (Non-Medical):  Not on file   Physical Activity:     Days of Exercise per Week: Not on file    Minutes of Exercise per Session: Not on file   Stress:     Feeling of Stress : Not on file   Social Connections:     Frequency of Communication with Friends and Family: Not on file    Frequency of Social Gatherings with Friends and Family: Not on file    Attends Caodaism Services: Not on file    Active Member of Clubs or Organizations: Not on file    Attends Club or Organization Meetings: Not on file    Marital Status: Not on file   Intimate Partner Violence:     Fear of Current or Ex-Partner: Not on file    Emotionally Abused: Not on file    Physically Abused: Not on file    Sexually Abused: Not on file   Housing Stability:     Unable to Pay for Housing in the Last Year: Not on file    Number of Places Lived in the Last Year: Not on file    Unstable Housing in the Last Year: Not on file       Family History:       Problem Relation Age of Onset    Schizophrenia Mother     Heart Disease Father        Allergies:  Codeine, Other, and Penicillin g    Home Medications:  Prior to Admission medications    Medication Sig Start Date End Date Taking? Authorizing Provider   NP THYROID 90 MG tablet  3/26/22   Historical Provider, MD   metoprolol succinate (TOPROL XL) 25 MG extended release tablet  2/15/22   Historical Provider, MD   CREON 6000-99265 units delayed release capsule  4/24/22   Historical Provider, MD   progesterone (PROMETRIUM) 100 MG CAPS capsule Take by mouth daily    Historical Provider, MD   Levothyroxine Sodium (TIROSINT) 125 MCG CAPS Take by mouth Daily    Historical Provider, MD   liothyronine (CYTOMEL) 5 MCG tablet Take 5 mcg by mouth daily    Historical Provider, MD   Multiple Vitamins-Minerals (MULTIVITAMIN PO) Take by mouth    Historical Provider, MD   Ascorbic Acid (VITAMIN C) 500 MG tablet Take 500 mg by mouth daily    Historical Provider, MD   ALPRAZolam (XANAX) 0.5 MG tablet Take 0.5 mg by mouth nightly as needed for Sleep    Historical Provider, MD       Current Medications:   No current facility-administered medications for this encounter. REVIEW OF SYSTEMS:       CONSTITUTIONAL: negative for fatigue and malaise   EYES: negative for double vision and photophobia    HEENT: negative for tinnitus and sore throat   RESPIRATORY: negative for cough, shortness of breath   CARDIOVASCULAR: negative for chest pain, palpitations   GASTROINTESTINAL: negative for nausea, vomiting   GENITOURINARY: negative for incontinence   MUSCULOSKELETAL: Positive for neck pain    NEUROLOGICAL: negative for headaches, memory problems, visual disturbance and syncope  Positive for coordination problems, weakness, numbness and tingling   PSYCHIATRIC: negative for decreased sleep     Review of systems otherwise negative.     PHYSICAL EXAM:       BP (!) 156/87   Pulse 83 Temp 98.4 °F (36.9 °C) (Oral)   Resp 12   SpO2 97%     CONSTITUTIONAL:  Well developed, well nourished, alert and oriented x 3, in no acute distress. GCS 15,    HEAD:  normocephalic, atraumatic    EYES:  PERRLA, EOMI,     ENT:  moist mucous membranes    NECK:  No midline tenderness to palpation     BACK:  without midline tenderness, step-offs or deformities    LUNGS:  Equal air entry bilaterally     CARDIOVASCULAR:  normal s1 / s2    ABDOMEN:  Soft, no rigidity    NEUROLOGIC:  Level of consciousness: alert, awake and oriented x 3. Sensation to touch and pinprick intact all over. Canales's sign negative. Clonus negative. 3/4 brachioradialis reflex     5/5 plantar flexion of the right ankle  5/5 dorsi flexion of the right ankle  5/5 plantar flexion of the left ankle  5/5 dorsi flexion of the left ankle  5/5 flexion of the right knee  5/5 flexion of the left knee  5/5  strength on the right  5/5  strength on the left  5/5 flexion of the right elbow  5/5 flexion of the left elbow  Shoulder elevation 5/5 bilaterally     SKIN:  no rash      LABS AND IMAGING:     Radiology Review:    Narrative   EXAMINATION:   MRI OF THE LUMBAR SPINE WITHOUT AND WITH CONTRAST  6/3/2022 9:17 am       TECHNIQUE:   Multiplanar multisequence MRI of the lumbar spine was performed without and   with the administration of intravenous contrast.       COMPARISON:   None.       HISTORY:   ORDERING SYSTEM PROVIDED HISTORY: Postlaminectomy syndrome of lumbar region   TECHNOLOGIST PROVIDED HISTORY:   STAT Creatinine as needed:->Yes   Pain, previous back surgery   Reason for Exam: LOW BACK PAIN. DIFF WALKING. X6 YRS.  HX BACK SURGERY       FINDINGS:   BONES/ALIGNMENT: There are 5 lumbar vertebral bodies.  There is   anterolisthesis at L3-4.  There is retrolisthesis at L2-3.  Vertebral body   heights are maintained.  Postoperative changes at L4-5 with associated   hardware artifact.  There is edema at L1-2 and T11 likely Modic type 1 degenerative change.       SPINAL CORD:  The conus terminates normally.       SOFT TISSUES: No abnormal enhancement is seen of the lumbar spine.  No   paraspinal mass identified.       L1-L2: There is no significant disc protrusion, spinal canal stenosis or   neural foraminal narrowing.       L2-L3: Disc protrusion and facet DJD with minimal spinal canal stenosis. Moderate bilateral neural foraminal stenosis.       L3-L4: Disc protrusion and facet DJD with mild spinal canal stenosis. Moderate bilateral neural foraminal stenosis.       L4-L5: Posterior decompression without significant spinal canal or neural   foraminal stenosis.       L5-S1: Disc protrusion and facet DJD with mild spinal canal stenosis. Moderate bilateral neural foraminal stenosis.         Impression   1. Prior posterior decompression at L4-5 without significant spinal canal   stenosis. 2. Multilevel degenerative change with mild spinal canal narrowing at L2-3,   L3-4, and L5-S1.   3. Multilevel neural foraminal narrowing. 4. Anterolisthesis at L3-4 with retrolisthesis at L2-3. Narrative   EXAMINATION:   MRI OF THE CERVICAL SPINE WITHOUT CONTRAST 6/3/2022 9:18 am       TECHNIQUE:   Multiplanar multisequence MRI of the cervical spine was performed without the   administration of intravenous contrast.       COMPARISON:   05/16/2022       HISTORY:   ORDERING SYSTEM PROVIDED HISTORY: Cervical radiculopathy   TECHNOLOGIST PROVIDED HISTORY:   Reason for Exam: PAIN IN NECK/ SHOOTS UP THE HEAD.  BILAT ARM PAIN/ WEAKNESS   X3 MONTHS HX THYROID CANCER.       FINDINGS:   BONES/ALIGNMENT: There is straightening of the normal cervical lordosis.  No   significant listhesis.  Vertebral body heights are maintained.  There is   edema at C3 and C4.       SPINAL CORD: Faint T2 hyperintensity in the cervical cord at C5-6.       SOFT TISSUES: No paraspinal mass identified.       C2-C3: There is no significant disc protrusion, spinal canal stenosis or neural foraminal narrowing.       C3-C4: Posterior disc osteophyte complex with moderate spinal canal stenosis. Uncovertebral facet DJD with moderate bilateral neural foraminal stenosis.       C4-C5: Posterior disc osteophyte complex without significant spinal canal   stenosis.  Mild bilateral neural foraminal narrowing.       C5-C6: Posterior disc osteophyte complex and ligamentum flavum hypertrophy   with moderate to severe spinal canal stenosis.  Uncovertebral facet DJD with   moderate bilateral neural foraminal stenosis.       C6-C7: Posterior disc osteophyte complex and ligamentum flavum hypertrophy   with moderate to severe spinal canal stenosis.  Uncovertebral facet DJD with   severe left and moderate right neural foraminal stenosis.       C7-T1: Posterior disc osteophyte complex without significant spinal canal or   right neural foraminal stenosis.  Moderate left neural foraminal narrowing.           Impression   1. Multilevel degenerative change with moderate to severe spinal canal   stenosis at C5-6 and C6-7.  There is moderate spinal canal narrowing at C3-4.   2. Multilevel neural foraminal narrowing as above. 3. Edema at C3-4, which may represent Modic type 1 degenerative change. 4. T2 hyperintensity in the cervical cord at C5-6, which may represent   myelomalacia. 5. Straightening of the normal cervical lordosis. ASSESSMENT AND PLAN:       Patient Active Problem List   Diagnosis    DDD (degenerative disc disease), lumbar    DDD (degenerative disc disease), cervical    Cervical stenosis of spine       65 y.o. female who presents with neck pain and numbness/tingling in her bilateral upper extremities. - We had discussion with patient regarding her MRI and clinical findings. She does have moderate stenotic disease primarily at C5-C6 but this does not explain all of her symptoms. Discussed referral to neurology to rule out other neuropathy causes.  We discussed that she may require surgical intervention for her stenosis but other causes must be ruled out first. This work up can be done outpatient.   - Start Neurontin   - Follow up with Raisa Walton in 4-6 weeks    Thank you for your consult.      José Luis Schofield DO    6/13/2022  2:17 PM

## 2022-06-13 NOTE — ED PROVIDER NOTES
Signed out by Dr. Tai Walters. C/o worsening bilaterally hand 'numbness' and weakness. Hx of recent MRI demonstrating cord edema at C3-C4. Pending Neurosurgery appt. Will consult neurosurgery. Dispo TBD. Daria Andrade MD  06/13/22 [id number]    I have been advised that the patient has been evaluated by the neurosurgical team and they are arranging for follow-up as an outpatient. Patient will be started on gabapentin in the meantime.        Daria Andrade MD  06/13/22 0412

## 2022-06-13 NOTE — Clinical Note
Taqueria Jackson was seen and treated in our emergency department on 6/13/2022. She may return to work on 06/15/2022. If you have any questions or concerns, please don't hesitate to call.       Donta Andrade, DO

## 2022-06-13 NOTE — ED PROVIDER NOTES
[address]  ED  Emergency Department Encounter  EmergencyMedicine Resident     Pt Roxane Montoya  MRN: [id number]  Acaciagfbrianna 1965  Date of evaluation: 22  PCP:  [facility]er Mahajan, [facility]       Chief Complaint   Patient presents with    Numbness     bilateral hands       HISTORY OF PRESENT ILLNESS  (Location/Symptom, Timing/Onset, Context/Setting, Quality, Duration, Modifying Factors, Severity.)      Selina Mccann is a 65 y.o. female who presents with bilateral hand numbness and tingling. Patient reports history of degenerative cervical spine issues, states that she has had chronic numbness and tingling in her hands but has become significantly worse over the past 2 to 3 days. Reporting burning sensation in palms of hands bilaterally. No recent trauma/injury. Does have distant history of back surgeries but denies any prior neck procedures. No recent injections. No anticoagulant use, no IV drug use. Denies any urinary retention, bowel or bladder incontinence. PAST MEDICAL / SURGICAL / SOCIAL / FAMILY HISTORY      has a past medical history of Adenoid cystic carcinoma ([facility] [address] Fibroid uterus, Graves disease, and Menorrhagia. has a past surgical history that includes Hysterectomy, total abdominal (6/28/10);  section, low transverse; Total Thyroidectomy (2015); and   picc powerpic single (2016). Social History     Socioeconomic History    Marital status:      Spouse name: Not on file    Number of children: Not on file    Years of education: Not on file    Highest education level: Not on file   Occupational History    Not on file   Tobacco Use    Smoking status: Former Smoker     Packs/day: 1.00     Years: 20.00     Pack years: 20.00    Smokeless tobacco: Never Used   Substance and Sexual Activity    Alcohol use:  Yes     Alcohol/week: 0.0 standard drinks     Comment: social    Drug use: No    Sexual activity: Yes   Other Topics Concern    Not on file   Social History Narrative    Not on file     Social Determinants of Health     Financial Resource Strain:     Difficulty of Paying Living Expenses: Not on file   Food Insecurity:     Worried About Running Out of Food in the Last Year: Not on file    Kari of Food in the Last Year: Not on file   Transportation Needs:     Lack of Transportation (Medical): Not on file    Lack of Transportation (Non-Medical): Not on file   Physical Activity:     Days of Exercise per Week: Not on file    Minutes of Exercise per Session: Not on file   Stress:     Feeling of Stress : Not on file   Social Connections:     Frequency of Communication with Friends and Family: Not on file    Frequency of Social Gatherings with Friends and Family: Not on file    Attends Jew Services: Not on file    Active Member of [address] [place] [address] [employer] or Organizations: Not on file    Attends Club or Organization Meetings: Not on file    Marital Status: Not on file   Intimate Partner Violence:     Fear of Current or Ex-Partner: Not on file    Emotionally Abused: Not on file    Physically Abused: Not on file    Sexually Abused: Not on file   Housing Stability:     Unable to Pay for Housing in the Last Year: Not on file    Number of [place] in the Last Year: Not on file    Unstable Housing in the Last Year: Not on file       Family History   Problem Relation Age of Onset    Schizophrenia Mother     Heart Disease Father        Allergies:  Codeine, Other, and Penicillin g    Home Medications:  Prior to Admission medications    Medication Sig Start Date End Date Taking?  Authorizing Provider   NP THYROID 90 MG tablet  3/26/22   Historical Provider, MD   metoprolol succinate (TOPROL XL) 25 MG extended release tablet  2/15/22   Historical Provider, MD   CREON 6000-18391 units delayed release capsule  4/24/22   Historical Provider, MD   progesterone (PROMETRIUM) 100 MG CAPS capsule Take by mouth daily    Historical Provider, MD   Levothyroxine Sodium (TIROSINT) 125 MCG CAPS Take by mouth Daily    Historical Provider, MD   liothyronine (CYTOMEL) 5 MCG tablet Take 5 mcg by mouth daily    Historical Provider, MD   Multiple Vitamins-Minerals (MULTIVITAMIN PO) Take by mouth    Historical Provider, MD   Ascorbic Acid (VITAMIN C) 500 MG tablet Take 500 mg by mouth daily    Historical Provider, MD   ALPRAZolam (XANAX) 0.5 MG tablet Take 0.5 mg by mouth nightly as needed for Sleep    Historical Provider, MD       REVIEW OF SYSTEMS    (2-9 systems for level 4, 10 or more for level 5)      Review of Systems   Constitutional: Negative for fatigue and fever. Eyes: Negative for photophobia and visual disturbance. Respiratory: Negative for cough, shortness of breath and wheezing. Cardiovascular: Negative for chest pain. Gastrointestinal: Negative for abdominal pain, nausea and vomiting. Genitourinary: Negative for dysuria and flank pain. Musculoskeletal: Positive for back pain and neck pain. Skin: Negative for rash and wound. Neurological: Positive for weakness and numbness. Negative for seizures and headaches. Psychiatric/Behavioral: Negative for confusion. PHYSICAL EXAM   (up to 7 for level 4, 8 or more for level 5)      INITIAL VITALS:   BP (!) 129/97   Pulse 70   Temp 98.4 °F (36.9 °C) (Oral)   Resp 16   SpO2 97%     Physical Exam  Constitutional:       General: She is not in acute distress. Appearance: She is not toxic-appearing. HENT:      Head: Normocephalic and atraumatic. Cardiovascular:      Rate and Rhythm: Normal rate. Heart sounds: No murmur heard. No gallop. Pulmonary:      Breath sounds: No wheezing, rhonchi or rales. Abdominal:      General: There is no distension. Tenderness: There is no abdominal tenderness. There is no guarding or rebound. Musculoskeletal:      Right lower leg: No edema. Left lower leg: No edema. Skin:     Findings: No erythema or rash. Neurological:      Mental Status: She is alert. Motor: No weakness.       Gait: Gait normal.         DIFFERENTIAL  DIAGNOSIS     PLAN (LABS / IMAGING / EKG):  Orders Placed This Encounter   Procedures    CBC with Auto Differential    Basic Metabolic Panel    TSH with Reflex   Ladan Soria MD, Neurology, [address] [place] [address]    Inpatient consult to Neurosurgery       MEDICATIONS ORDERED:  Orders Placed This Encounter   Medications    fentaNYL (SUBLIMAZE) injection 50 mcg    ondansetron (ZOFRAN) injection 4 mg       DDX: Cervical radiculopathy, spinal cord edema, foraminal stenosis, anterolisthesis, disc herniation    DIAGNOSTIC RESULTS / EMERGENCY DEPARTMENT COURSE / MDM   LAB RESULTS:  Results for orders placed or performed during the hospital encounter of 06/13/22   CBC with Auto Differential   Result Value Ref Range    WBC 7.0 3.5 - 11.3 k/uL    RBC 4.71 3.95 - 5.11 m/uL    Hemoglobin 14.7 11.9 - 15.1 g/dL    Hematocrit 43.4 36.3 - 47.1 %    MCV 92.1 82.6 - 102.9 fL    MCH 31.2 25.2 - 33.5 pg    MCHC 33.9 28.4 - 34.8 g/dL    RDW 12.8 11.8 - 14.4 %    Platelets 334 059 - 036 k/uL    MPV 10.6 8.1 - 13.5 fL    NRBC Automated 0.0 0.0 per 100 WBC    Seg Neutrophils 50 36 - 65 %    Lymphocytes 36 24 - 43 %    Monocytes 10 3 - 12 %    Eosinophils % 3 1 - 4 %    Basophils 1 0 - 2 %    Immature Granulocytes 0 0 %    Segs Absolute 3.56 1.50 - 8.10 k/uL    Absolute Lymph # 2.49 1.10 - 3.70 k/uL    Absolute Mono # 0.68 0.10 - 1.20 k/uL    Absolute Eos # 0.19 0.00 - 0.44 k/uL    Basophils Absolute 0.05 0.00 - 0.20 k/uL    Absolute Immature Granulocyte <0.03 0.00 - 0.30 k/uL   Basic Metabolic Panel   Result Value Ref Range    Glucose 118 (H) 70 - 99 mg/dL    BUN 12 6 - 20 mg/dL    CREATININE 0.50 0.50 - 0.90 mg/dL    Calcium 9.9 8.6 - 10.4 mg/dL    Sodium 136 135 - 144 mmol/L    Potassium 4.4 3.7 - 5.3 mmol/L    Chloride 96 (L) 98 - 107 mmol/L    CO2 25 20 - 31 mmol/L    Anion Gap 15 9 - 17 mmol/L    GFR Non-African American >60 >60 mL/min    GFR African American >60 >60 mL/min    GFR Comment         TSH with Reflex   Result Value Ref Range    TSH 4.82 0.30 - 5.00 uIU/mL       IMPRESSION/ ED Course: 56-year-old female arriving with bilateral arm pain, paresthesias in bilateral hands. Patient did have outpatient MRI approximately 10 days ago which demonstrated spinal cord edema and was referred to neurosurgery on outpatient basis however patient has been unable to be evaluated by neurosurgery yet. Reports significant worsening of symptoms over past several days. Patient offered dose of Decadron in the emergency department but refusing any steroids at this time. Was treated with analgesics for hyperesthesia, bilateral arm pain. CBC, BMP, TSH with no acute abnormalities. Neurosurgery was consulted, patient signed out to Dr. Dioni Ugarte awaiting final recommendations from neurosurgery    RADIOLOGY:  CT Cervical Spine WO Contrast    Result Date: 5/16/2022  EXAMINATION: CT OF THE CERVICAL SPINE WITHOUT CONTRAST 5/16/2022 3:25 pm TECHNIQUE: CT of the cervical spine was performed without the administration of intravenous contrast. Multiplanar reformatted images are provided for review. Automated exposure control, iterative reconstruction, and/or weight based adjustment of the mA/kV was utilized to reduce the radiation dose to as low as reasonably achievable. COMPARISON: None. HISTORY: ORDERING SYSTEM PROVIDED HISTORY: neck pain radiates right arm TECHNOLOGIST PROVIDED HISTORY: neck pain radiates right arm Decision Support Exception - unselect if not a suspected or confirmed emergency medical condition->Emergency Medical Condition (MA) Reason for Exam: right arm numbness, no injury, no prior surgery FINDINGS: BONES/ALIGNMENT: There is no acute fracture or traumatic malalignment. Some disc bulging C3-C4. No disc herniation identified on CT. DEGENERATIVE CHANGES: Mild-mod spondylosis, greatest C5-C7.   Intervertebral disc bilaterally at L4-L5. Posterior fusion with paraspinal rods and pedicle screws and interbody graft. There is mature osseous fusion across the graft. The remaining posterior elements appear intact. No osseous destructive lesion is seen. DEGENERATIVE CHANGES: Multilevel disc height loss at the non operative levels, greatest at L2-L3 where height loss is moderate and there is associated vacuum disc phenomenon. Degenerative endplate changes also predominate at L2-L3. Facet arthrosis is overall mild. Mild spinal canal stenosis at L2 through L4. Moderate left L3-L4 neural foraminal stenosis with mild stenosis bilaterally at L2-L3, on the right at L3-L4, and bilaterally at L5-S1. The neural foramen at the operative levels appear grossly patent the spinal canal is decompressed posteriorly. SOFT TISSUES/RETROPERITONEUM: No acute abnormality in the paraspinal soft tissues or in the retroperitoneum. Atherosclerotic calcifications in the normal caliber aorta. Normal appendix. Colonic diverticulosis. Posterior interbody fusion at L4-L5. Mature osseous fusion across the interbody graft and no definite findings of hardware failure or perihardware lucency. Degenerative changes which predominate above the operative level at L2 through L4. MRI CERVICAL SPINE WO CONTRAST    Result Date: 6/3/2022  EXAMINATION: MRI OF THE CERVICAL SPINE WITHOUT CONTRAST 6/3/2022 9:18 am TECHNIQUE: Multiplanar multisequence MRI of the cervical spine was performed without the administration of intravenous contrast. COMPARISON: 05/16/2022 HISTORY: ORDERING SYSTEM PROVIDED HISTORY: Cervical radiculopathy TECHNOLOGIST PROVIDED HISTORY: Reason for Exam: PAIN IN NECK/ SHOOTS UP THE HEAD. BILAT ARM PAIN/ WEAKNESS X3 MONTHS HX THYROID CANCER. FINDINGS: BONES/ALIGNMENT: There is straightening of the normal cervical lordosis. No significant listhesis. Vertebral body heights are maintained. There is edema at C3 and C4.  SPINAL CORD: Faint T2 hyperintensity in the cervical cord at C5-6. SOFT TISSUES: No paraspinal mass identified. C2-C3: There is no significant disc protrusion, spinal canal stenosis or neural foraminal narrowing. C3-C4: Posterior disc osteophyte complex with moderate spinal canal stenosis. Uncovertebral facet DJD with moderate bilateral neural foraminal stenosis. C4-C5: Posterior disc osteophyte complex without significant spinal canal stenosis. Mild bilateral neural foraminal narrowing. C5-C6: Posterior disc osteophyte complex and ligamentum flavum hypertrophy with moderate to severe spinal canal stenosis. Uncovertebral facet DJD with moderate bilateral neural foraminal stenosis. C6-C7: Posterior disc osteophyte complex and ligamentum flavum hypertrophy with moderate to severe spinal canal stenosis. Uncovertebral facet DJD with severe left and moderate right neural foraminal stenosis. C7-T1: Posterior disc osteophyte complex without significant spinal canal or right neural foraminal stenosis. Moderate left neural foraminal narrowing. 1. Multilevel degenerative change with moderate to severe spinal canal stenosis at C5-6 and C6-7. There is moderate spinal canal narrowing at C3-4. 2. Multilevel neural foraminal narrowing as above. 3. Edema at C3-4, which may represent Modic type 1 degenerative change. 4. T2 hyperintensity in the cervical cord at C5-6, which may represent myelomalacia. 5. Straightening of the normal cervical lordosis. MRI LUMBAR SPINE W WO CONTRAST    Result Date: 6/3/2022  EXAMINATION: MRI OF THE LUMBAR SPINE WITHOUT AND WITH CONTRAST  6/3/2022 9:17 am TECHNIQUE: Multiplanar multisequence MRI of the lumbar spine was performed without and with the administration of intravenous contrast. COMPARISON: None. HISTORY: ORDERING SYSTEM PROVIDED HISTORY: Postlaminectomy syndrome of lumbar region TECHNOLOGIST PROVIDED HISTORY: STAT Creatinine as needed:->Yes Pain, previous back surgery Reason for Exam: LOW BACK PAIN. DIFF WALKING. X6 YRS. HX BACK SURGERY FINDINGS: BONES/ALIGNMENT: There are 5 lumbar vertebral bodies. There is anterolisthesis at L3-4. There is retrolisthesis at L2-3. Vertebral body heights are maintained. Postoperative changes at L4-5 with associated hardware artifact. There is edema at L1-2 and T11 likely Modic type 1 degenerative change. SPINAL CORD:  The conus terminates normally. SOFT TISSUES: No abnormal enhancement is seen of the lumbar spine. No paraspinal mass identified. L1-L2: There is no significant disc protrusion, spinal canal stenosis or neural foraminal narrowing. L2-L3: Disc protrusion and facet DJD with minimal spinal canal stenosis. Moderate bilateral neural foraminal stenosis. L3-L4: Disc protrusion and facet DJD with mild spinal canal stenosis. Moderate bilateral neural foraminal stenosis. L4-L5: Posterior decompression without significant spinal canal or neural foraminal stenosis. L5-S1: Disc protrusion and facet DJD with mild spinal canal stenosis. Moderate bilateral neural foraminal stenosis. 1. Prior posterior decompression at L4-5 without significant spinal canal stenosis. 2. Multilevel degenerative change with mild spinal canal narrowing at L2-3, L3-4, and L5-S1. 3. Multilevel neural foraminal narrowing. 4. Anterolisthesis at L3-4 with retrolisthesis at L2-3. CONSULTS:  IP CONSULT TO NEUROSURGERY    CRITICAL CARE:  Please see attending note    FINAL IMPRESSION      1.  Paresthesia and pain of both upper extremities          DISPOSITION / PLAN     DISPOSITION Decision To Discharge 06/13/2022 04:45:21 PM      PATIENT REFERRED TO:  [address], [place] [address]  MOB # Supriya [address]  [phone]    Schedule an appointment as soon as possible for a visit in 4 weeks  Please follow up with neurosurgery in 4-6 weeks    Parvez Briggs MD  [address] [facility]  [phone]    Call today  for followup and reevaluation in 1-2 days    [facility]  [address] [facility] [address]  [phone]  Go to   If symptoms worsen, As needed    Rina Gutiérrez MD  [place] [address]  [phone]            DISCHARGE MEDICATIONS:  New Prescriptions    No medications on file       Maria G Pickard DO  Emergency Medicine Resident    (Please note that portions of thisnote were completed with a voice recognition program.  Efforts were made to edit the dictations but occasionally words are mis-transcribed.)        Mraia G Pickard DO  Resident  06/13/22 [id number]

## 2022-06-13 NOTE — ED PROVIDER NOTES
[facility]  ED     Emergency Department     Faculty Attestation    I performed a history and physical examination of the patient and discussed management with the resident. I reviewed the residents note and agree with the documented findings and plan of care. Any areas of disagreement are noted on the chart. I was personally present for the key portions of any procedures. I have documented in the chart those procedures where I was not present during the key portions. I have reviewed the emergency nurses triage note. I agree with the chief complaint, past medical history, past surgical history, allergies, medications, social and family history as documented unless otherwise noted below. For Physician Assistant/ Nurse Practitioner cases/documentation I have personally evaluated this patient and have completed at least one if not all key elements of the E/M (history, physical exam, and MDM). Additional findings are as noted. Patient presents with paresthesias to her bilateral upper extremities. Patient did have a recent MRI of the cervical spine which showed edema at C3-4. Patient says she has been having these complaints for a while but they significantly worsened on Saturday. Patient denies any new injuries or falls. She denies any fever, headache, dizziness, chest pain, shortness of breath, abdominal pain, nausea or vomiting. She denies any weakness or numbness to her legs. On exam, patient is resting comfortably in the bed. She is alert and oriented and answering questions appropriately. Patient does have decreased  strength bilaterally. Will check labs and plan to speak with neurosurgery.       Araseli Foy MD  Attending Emergency  Physician              Jeferson Wing MD  06/13/22 [id number]

## 2022-06-13 NOTE — ED PROVIDER NOTES
[address] ED  Emergency Department  Emergency Medicine Resident Sign-out     Care of Chase Zeng was assumed from Dr. Janeth Whitman and is being seen for Numbness (bilateral hands)   . The patient's initial evaluation and plan have been discussed with the prior provider who initially evaluated the patient. EMERGENCY DEPARTMENT COURSE / MEDICAL DECISION MAKING:       MEDICATIONS GIVEN:  Orders Placed This Encounter   Medications    fentaNYL (SUBLIMAZE) injection 50 mcg    ondansetron (ZOFRAN) injection 4 mg       LABS / RADIOLOGY:     Labs Reviewed   BASIC METABOLIC PANEL - Abnormal; Notable for the following components:       Result Value    Glucose 118 (*)     Chloride 96 (*)     All other components within normal limits   CBC WITH AUTO DIFFERENTIAL   TSH WITH REFLEX       No orders to display       RECENT VITALS:     Temp: 98.4 °F (36.9 °C),  Heart Rate: 70, Resp: 16, BP: (!) 129/97, SpO2: 97 %    This patient is a 65 y.o. Female with     ED Course as of 06/13/22 1805   Mon Jun 13, 2022   1533 Care assumed from dr. Emily Loera.  [BG]   1533 Edema on outpatient MRI on 6/3/2022 worsening lesions and pain of of r bilateral  extremity since that time. Follow-up neurosurgery recommendations. Patient refused steroids [BG]   1638 Seen and evaluated by the neurosurgical service recommending follow-up as an outpatient in 4 to 6 weeks with initiation of gabapentin 300 mg p.o. 3 times daily at this time. [BG]   1648 Evaluated feeling well agreeable with outpatient follow-up all questions addressed [BG]      ED Course User Index  [BG] Ja Abbott,        OUTSTANDING TASKS / RECOMMENDATIONS:      1. followup nsg recs     FINAL IMPRESSION:     1. Paresthesia and pain of both upper extremities        DISPOSITION:       DISPOSITION:  [x]  Discharge   []  Transfer -    []  Admission -     []  Against Medical Advice   []  Eloped   FOLLOW-UP: No follow-up provider specified.    DISCHARGE MEDICATIONS: New Prescriptions    No medications on file          Ambreen Centeno DO  Emergency Medicine Resident  [address]        Ambreen Centeno [place]  Resident  06/13/22 [id number]

## 2022-06-14 ENCOUNTER — OFFICE VISIT (OUTPATIENT)
Dept: NEUROLOGY | Age: 57
End: 2022-06-14
Payer: COMMERCIAL

## 2022-06-14 VITALS
BODY MASS INDEX: 30.79 KG/M2 | SYSTOLIC BLOOD PRESSURE: 133 MMHG | WEIGHT: 173.8 LBS | HEART RATE: 74 BPM | DIASTOLIC BLOOD PRESSURE: 96 MMHG | HEIGHT: 63 IN

## 2022-06-14 DIAGNOSIS — R20.0 NUMBNESS: Primary | ICD-10-CM

## 2022-06-14 LAB
EKG ATRIAL RATE: 93 BPM
EKG P AXIS: 23 DEGREES
EKG P-R INTERVAL: 142 MS
EKG Q-T INTERVAL: 362 MS
EKG QRS DURATION: 82 MS
EKG QTC CALCULATION (BAZETT): 450 MS
EKG R AXIS: 0 DEGREES
EKG T AXIS: 16 DEGREES
EKG VENTRICULAR RATE: 93 BPM

## 2022-06-14 PROCEDURE — 99204 OFFICE O/P NEW MOD 45 MIN: CPT | Performed by: PSYCHIATRY & NEUROLOGY

## 2022-06-14 PROCEDURE — 93010 ELECTROCARDIOGRAM REPORT: CPT | Performed by: INTERNAL MEDICINE

## 2022-06-14 PROCEDURE — G8417 CALC BMI ABV UP PARAM F/U: HCPCS | Performed by: PSYCHIATRY & NEUROLOGY

## 2022-06-14 PROCEDURE — 1036F TOBACCO NON-USER: CPT | Performed by: PSYCHIATRY & NEUROLOGY

## 2022-06-14 PROCEDURE — 3017F COLORECTAL CA SCREEN DOC REV: CPT | Performed by: PSYCHIATRY & NEUROLOGY

## 2022-06-14 PROCEDURE — G8427 DOCREV CUR MEDS BY ELIG CLIN: HCPCS | Performed by: PSYCHIATRY & NEUROLOGY

## 2022-06-14 RX ORDER — ESTRADIOL 0.05 MG/D
FILM, EXTENDED RELEASE TRANSDERMAL
COMMUNITY
Start: 2022-06-03

## 2022-06-14 NOTE — PROGRESS NOTES
[facility]  Anth[facility]ly [facility], [address], [address] [place] [address]  Ph: [phone] or [phone]  FAX: [phone]    Chief Complaint: Numbness in hands     Dear Romeo Sykes MD     I had the pleasure of seeing your patient today in neurology consultation for her symptoms. As you would recall Virginia Greene is a 65 y.o. female. The patient is accompanied by her . Patient reports having a sudden episode of loss of sensation and strength in her both upper extremities and shooting pain radiating towards her head. The episode lasted for about half hour during type. Symptom persisted for about 2 days. Improved. The pain reports extreme nausea with this episode. He tried to do traction physical therapy without improvement. She also had physical therapy 2022 which did not help her symptoms. She reports having intermittent bowel pain and charley horse in her mouth.   The patient had very bad pain management in the last and has received MRI cervical spine and lumbar spine and 2022  C spine shows multilevel DJD with moderate to severe spinal canal stenosis at C5-C6 and C6-C7 with evidence of myelomalacia      Neurological workup:  MRI LUMBAR SPINE W WO CONTRAST         Past Medical History:   Diagnosis Date    [facility])     Fibroid uterus     Graves disease     Menorrhagia      Past Surgical History:   Procedure Laterality Date     SECTION, LOW TRANSVERSE      HC  PICC POWERPIC SINGLE  2016         HYSTERECTOMY, TOTAL ABDOMINAL (CERVIX REMOVED)  6/28/10    TOTAL THYROIDECTOMY  2015     Allergies   Allergen Reactions    Codeine     Other      ALL NARCOTICS    Penicillin G Other (See Comments)     Family History   Problem Relation Age of Onset    Schizophrenia Mother     Heart Disease Father       Social History     Socioeconomic History    Marital status:      Spouse name: Not on file    Number of kg/m²       HEENT [] Hearing Loss  [] Visual Disturbance  [] Tinnitus  [] Eye pain   Respiratory [] Shortness of Breath  [] Cough  [] Snoring   Cardiovascular [] Chest Pain  [] Palpitations  [] Lightheaded   GI [] Constipation  [] Diarrhea  [] Swallowing change  [] Nausea/vomiting    [] Urinary Frequency  [] Urinary Urgency   Musculoskeletal [] Neck pain  [] Back pain  [] Muscle pain  [] Restless legs   Dermatologic [] Skin changes   Neurologic [] Memory loss/confusion  [] Seizures  [] Trouble walking or imbalance  [] Dizziness  [] Sleep disturbance  [] Weakness  [x] Numbness  [] Tremors  [] Speech Difficulty  [] Headaches  [] Light Sensitivity  [] Sound Sensitivity   Endocrinology []Excessive thirst  []Excessive hunger   Psychiatric [] Anxiety/Depression  [] Hallucination   Allergy/immunology []Hives/environmental allergies   Hematologic/lymph [] Abnormal bleeding  [] Abnormal bruising         General appearence: Normal. Well groomed.  In no acute distress    Head: Normocephalic, atraumatic  Eyes: Extraocular movements intact, eye lids normal  Lungs: Respirations unlabored, chest wall no deformity  ENT: Normal external ear canals, no sinus tenderness  Heart: Regular rate rhythm  Abdomen: No masses, tenderness  Extremities: No cyanosis or edema, 2+ pulses  Musculoskeletal: Normal range of motion in all joints  Skin: Intact, normal skin color    Neurological examination:    Mental status   Alert and oriented; intact memory with no confusion, speech or language problems; no hallucinations or delusions     Cranial nerves   II - visual fields intact to confrontation                                                III, IV, VI - extra-ocular muscles full: no pupillary defect; no DALJIT, no nystagmus, no ptosis   V - normal facial sensation                                                               VII - normal facial symmetry                                                             VIII - intact hearing IX, X - symmetrical palate                                                                  XI - symmetrical shoulder shrug                                                       XII - midline tongue without atrophy or fasciculation     Motor function  Normal muscle bulk and tone; normal power 5/5, including fine motor movements     Sensory function Intact to touch, pin, vibration, proprioception     Cerebellar Intact fine motor movement. No involuntary movements or tremors     Reflex function Intact 2+ DTR and symmetric. Negative Babinski     Gait                  Normal station and gait       No results found for: LDLCALC, LDLCHOLESTEROL, LDLDIRECT  No components found for: CHLPL  No results found for: TRIG  No results found for: HDL  No results found for: LDLCALC  No results found for: LABVLDL  No results found for: LABA1C  No results found for: EAG  No results found for: [id number]   Neurological work up:  CT head  CTA head and neck  MRI brain   2 D echo     All of patient's labs were personally reviewed. All the imaging studies were personally reviewed and discussed with the patient. Assessment Recommendations:  Patient with numbness in both upper extremities. Possible cervical radiculopathy  Patient MRI cervical spine has shown multilevel disease suggesting myeloradiculopathy. Patient to follow-up with neuro surgery for her MRI cervical spine changes. I scheduled the patient for EMG nerve megastudy of the upper extremities  Follow-up in 3 to 4 weeks      Brian Almonte MD I would like to thank you for the consult. Please do not hesitate if you have any questions about the patient care.      Scribe Attestation:   By signing my name below, Tayla Vegas, attest that this documentation has been prepared under the direction and in the presence of Cl Fajardo MD.    Electronically Signed: Raymundo RIVERA. 06/14/22. 11:10 AM         This note is created with the assistance of a speech-recognition program. While intending to generate a document that actually reflects the content of the visit, the document can still have some errors including those of syntax and sound a- like substitutions which may escape proofreading. In such instances, actual meaning can be extrapolated by contextual derivation.

## 2022-06-21 ENCOUNTER — PROCEDURE VISIT (OUTPATIENT)
Dept: PHYSICAL MEDICINE AND REHAB | Age: 57
End: 2022-06-21
Payer: COMMERCIAL

## 2022-06-21 DIAGNOSIS — G56.03 BILATERAL CARPAL TUNNEL SYNDROME: Primary | ICD-10-CM

## 2022-06-21 PROCEDURE — 95886 MUSC TEST DONE W/N TEST COMP: CPT | Performed by: STUDENT IN AN ORGANIZED HEALTH CARE EDUCATION/TRAINING PROGRAM

## 2022-06-21 PROCEDURE — 95913 NRV CNDJ TEST 13/> STUDIES: CPT | Performed by: STUDENT IN AN ORGANIZED HEALTH CARE EDUCATION/TRAINING PROGRAM

## 2022-07-10 NOTE — PROGRESS NOTES
[address] [place] [address],[address] PHYSICAL MEDICINE AND REHABILITATION  [address] [address] [address]  Dept: [phone]  Dept Fax: [phone]    EMG/NCS Bilateral Upper Limbs    Subjective:     Radha Vallejo is a 65y.o.-year-old right-handed female presenting with chronic, intermittent numbness/tingling in the bilateral upper limbs. She also notes weakness in the bilateral hands, causing her to drop things. She states that symptoms are about equal bilaterally. She also notes neck pain but denies any shooting pain in the bilateral upper limbs. She states that she had an episode a couple of weeks ago in which she felt like her arms were not attached to her body after sitting for about 3 hours. 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 In no distress. Pulmonary/Chest: Respirations WNL and unlabored. MSK:  Normal cervical spine ROM. Neurological: She is alert. Sensation to light touch intact in bilateral upper limbs. Strength 5/5 in bilateral upper limbs. Negative Canales's reflex bilaterally. Negative Tinel sign at the bilateral wrists and bilateral medial elbows. Skin: Skin is warm and dry with good turgor. Imaging  MRI lumbar spine, 6/3/22:  Impression   1. Prior posterior decompression at L4-5 without significant spinal canal   stenosis. 2. Multilevel degenerative change with mild spinal canal narrowing at L2-3,   L3-4, and L5-S1.   3. Multilevel neural foraminal narrowing. 4. Anterolisthesis at L3-4 with retrolisthesis at L2-3. Findings:     The procedure was explained to the patient prior to beginning the test.  Risks and benefits of the procedure were discussed.   Patient gave verbal consent to proceed. The left median sensory studies to digits 1 and 3 are normal.  However, the latency of the left median sensory study to digit 1 is prolonged compared to the left radial sensory study to digit 1, and the latency of the left median sensory study to digit 3 is prolonged compared to the left ulnar sensory study to digit 5. For the left median sensory study to digit 3, the latency of the proximal segment is equal to the latency of the distal segment. The left median/ulnar palm comparison study shows slightly prolonged latency of the median segment compared to the ulnar segment. Tilda Perfect CSI is 1.1. The left radial sensory study to digit 1 is normal.  The left ulnar sensory study to digit 5 is normal.  The left median motor study to APB is normal.  The left ulnar motor study to ADM is normal.    The right median sensory study to digit 1 shows no response. The right median sensory study to digit 3 shows prolonged latency and decreased amplitude. At digit 3, the latency of the proximal segment is prolonged compared to the latency of the distal segment. The right radial sensory study to digit 1 is normal.  The right ulnar sensory study to digit 5 is normal.  The right median motor study to APB shows prolonged latency and slightly decreased conduction velocity with normal amplitude. The right ulnar motor study to ADM is normal.    EMG of selected muscles of the left upper limb shows decreased number of motor units at flexor carpi radialis. Needle EMG of the deltoid, biceps, triceps, extensor digitorum, and first dorsal interosseous muscles is normal.    EMG of selected muscles of the right upper limb shows increased amplitude and decreased number of motor units at flexor carpi radialis. Needle EMG of the deltoid, biceps, triceps, extensor digitorum, and first dorsal interosseous muscles is normal.    Impression:     1. Abnormal electrodiagnostic study.   2. There is evidence of bilateral median neuropathy at the wrist (carpal tunnel syndrome), moderate on the right and very mild on the left. 3. There are chronic changes on needle EMG of the bilateral flexor carpi radialis muscles, which is of uncertain significance, as needle EMG of the remaining muscles is normal.  4. There is no evidence of bilateral ulnar neuropathy, plexopathy, or myopathy. Please see separate document with nerve conduction and EMG tables.       Electronically signed by Rafi Wood MD on 7/10/2022 at 2:00 PM

## 2022-07-18 ENCOUNTER — OFFICE VISIT (OUTPATIENT)
Dept: NEUROSURGERY | Age: 57
End: 2022-07-18
Payer: COMMERCIAL

## 2022-07-18 VITALS
WEIGHT: 178.6 LBS | SYSTOLIC BLOOD PRESSURE: 132 MMHG | HEIGHT: 63 IN | DIASTOLIC BLOOD PRESSURE: 75 MMHG | HEART RATE: 70 BPM | OXYGEN SATURATION: 95 % | BODY MASS INDEX: 31.64 KG/M2 | TEMPERATURE: 97.3 F

## 2022-07-18 DIAGNOSIS — R20.2 PARESTHESIA AND PAIN OF BOTH UPPER EXTREMITIES: Primary | ICD-10-CM

## 2022-07-18 DIAGNOSIS — M79.601 PARESTHESIA AND PAIN OF BOTH UPPER EXTREMITIES: Primary | ICD-10-CM

## 2022-07-18 DIAGNOSIS — M79.602 PARESTHESIA AND PAIN OF BOTH UPPER EXTREMITIES: Primary | ICD-10-CM

## 2022-07-18 PROCEDURE — 99214 OFFICE O/P EST MOD 30 MIN: CPT | Performed by: NEUROLOGICAL SURGERY

## 2022-07-18 NOTE — PROGRESS NOTES
[address]  [facility] [address], [address] [facility] [address]  Dept: [phone]    Patient:  Sia Degroot  YOB: 1965  Date: 22    The patient is a 65 y.o. female who presents today for consult of the following problems:     Chief Complaint   Patient presents with    ED Follow-up     MRI: Changes at C5-C6. HPI:     Sia Degroot is a 65 y.o. female on whom neurosurgical consultation was requested by Brian Almonte MD for management of bilateral upper extremity neuropathy with numbness in bilateral hands. Left greater than right. Also with some numbness and pain throughout the lower extremity and upper extremity without a specific dermatomal distribution. The patient started having this after initiating physical therapy and also complains of significant pain without any distributional etiology. No correlation with neck position but does state that she gets numbness and issues with her arms and she lifts a lot. No dexterity issues or weakness. No bowel bladder incontinence as well. 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[facility] [address]     Fibroid uterus     Graves disease     Menorrhagia      Past Surgical History:   Procedure Laterality Date     SECTION, LOW TRANSVERSE      HC  PICC POWERPIC SINGLE  2016         HYSTERECTOMY, TOTAL ABDOMINAL (CERVIX REMOVED)  6/28/10    TOTAL THYROIDECTOMY  2015     Family History   Problem Relation Age of Onset    Schizophrenia Mother     Heart Disease Father      Current Outpatient Medications on File Prior to Visit   Medication Sig Dispense Refill    estradiol (VIVELLE) 0.05 MG/24HR       gabapentin (NEURONTIN) 100 MG capsule Take 3 capsules by mouth 3 times daily for 180 days.  Intended supply: 90 days 270 capsule 5    NP THYROID 90 MG tablet       metoprolol succinate (TOPROL XL) 25 MG extended release tablet       CREON 6000-53721 units delayed release capsule       progesterone (PROMETRIUM) 100 MG CAPS capsule Take by mouth daily      Multiple Vitamins-Minerals (MULTIVITAMIN PO) Take by mouth      Ascorbic Acid (VITAMIN C) 500 MG tablet Take 500 mg by mouth daily      Levothyroxine Sodium 125 MCG CAPS Take by mouth Daily (Patient not taking: No sig reported)      liothyronine (CYTOMEL) 5 MCG tablet Take 5 mcg by mouth daily (Patient not taking: No sig reported)      ALPRAZolam (XANAX) 0.5 MG tablet Take 0.5 mg by mouth nightly as needed for Sleep (Patient not taking: No sig reported)       No current facility-administered medications on file prior to visit. Social History     Tobacco Use    Smoking status: Former     Packs/day: 1.00     Years: 20.00     Pack years: 20.00     Types: Cigarettes    Smokeless tobacco: Never   Substance Use Topics    Alcohol use: Yes     Alcohol/week: 0.0 standard drinks     Comment: social    Drug use: No       Allergies   Allergen Reactions    Codeine     Other      ALL NARCOTICS    Penicillin G Other (See Comments)       Review of Systems  ROS: numbness    Physical Exam:      /75 (Site: Right Upper Arm, Position: Sitting, Cuff Size: Medium Adult)   Pulse 70   Temp 97.3 °F (36.3 °C) (Temporal)   Ht 5' 3\" (1.6 m)   Wt 178 lb 9.6 oz (81 kg)   SpO2 95%   BMI 31.64 kg/m²   Estimated body mass index is 31.64 kg/m² as calculated from the following:    Height as of this encounter: 5' 3\" (1.6 m). Weight as of this encounter: 178 lb 9.6 oz (81 kg). General:  Kristina Leyden is a 65y.o. year old female who appears her stated age. HEENT: Normocephalic atraumatic. Neck supple. Chest: regular rate; pulses equal. Equal chest rise and fall  Abdomen: Soft nondistended.    Ext: DP equal with good capillary refill  Neuro    Mentation  Appropriate affect   oriented    Cranial Nerves:   Pupils equal and reactive to signed by Yahaira Andrews DO on 7/18/2022 at 3:13 PM    Please note that this chart was generated using voice recognition Dragon dictation software. Although every effort was made to ensure the accuracy of this automated transcription, some errors in transcription may have occurred.

## 2022-07-19 ENCOUNTER — TELEPHONE (OUTPATIENT)
Dept: NEUROLOGY | Age: 57
End: 2022-07-19

## 2022-07-19 DIAGNOSIS — R20.0 NUMBNESS: Primary | ICD-10-CM

## 2022-07-19 DIAGNOSIS — M79.602 PARESTHESIA AND PAIN OF BOTH UPPER EXTREMITIES: ICD-10-CM

## 2022-07-19 DIAGNOSIS — R20.2 PARESTHESIA AND PAIN OF BOTH UPPER EXTREMITIES: ICD-10-CM

## 2022-07-19 DIAGNOSIS — M79.601 PARESTHESIA AND PAIN OF BOTH UPPER EXTREMITIES: ICD-10-CM

## 2022-07-19 NOTE — TELEPHONE ENCOUNTER
Uma Alcon called asking if her EMG results were in. She also seen Dr. Jody Garcia at neurosurgery yesterday and said he was sending you her information. Please advise.

## 2022-07-20 NOTE — TELEPHONE ENCOUNTER
Orders added. Call placed to  patient, her  was reached and he said he'd have her call back for results.

## 2022-07-21 NOTE — TELEPHONE ENCOUNTER
EMG faxed to Dr. Nivia Best at the [facility], patient requested because they got her in in 1 week.

## 2022-07-21 NOTE — TELEPHONE ENCOUNTER
Patient notified. She did say she was diagnosed with carpal tunnel syndrome about 30 years ago of which symptoms subsided. She said these are new symptoms.

## 2022-07-27 ENCOUNTER — HOSPITAL ENCOUNTER (OUTPATIENT)
Age: 57
Setting detail: SPECIMEN
Discharge: HOME | End: 2022-07-27

## 2022-07-27 DIAGNOSIS — R20.2 PARESTHESIA AND PAIN OF BOTH UPPER EXTREMITIES: ICD-10-CM

## 2022-07-27 DIAGNOSIS — M79.601 PARESTHESIA AND PAIN OF BOTH UPPER EXTREMITIES: ICD-10-CM

## 2022-07-27 DIAGNOSIS — M79.602 PARESTHESIA AND PAIN OF BOTH UPPER EXTREMITIES: ICD-10-CM

## 2022-07-27 DIAGNOSIS — R20.0 NUMBNESS: ICD-10-CM

## 2022-07-27 LAB
FOLATE: >20 NG/ML
VITAMIN B-12: 778 PG/ML (ref 232–1245)

## 2022-07-28 DIAGNOSIS — R20.2 PARESTHESIA AND PAIN OF BOTH UPPER EXTREMITIES: Primary | ICD-10-CM

## 2022-07-28 DIAGNOSIS — M79.601 PARESTHESIA AND PAIN OF BOTH UPPER EXTREMITIES: Primary | ICD-10-CM

## 2022-07-28 DIAGNOSIS — M79.602 PARESTHESIA AND PAIN OF BOTH UPPER EXTREMITIES: Primary | ICD-10-CM

## 2022-07-28 LAB
ALBUMIN (CALCULATED): 4.9 G/DL (ref 3.2–5.2)
ALBUMIN PERCENT: 64 % (ref 45–65)
ALPHA 1 PERCENT: 2 % (ref 3–6)
ALPHA 2 PERCENT: 10 % (ref 6–13)
ALPHA-1-GLOBULIN: 0.2 G/DL (ref 0.1–0.4)
ALPHA-2-GLOBULIN: 0.8 G/DL (ref 0.5–0.9)
BETA GLOBULIN: 0.7 G/DL (ref 0.5–1.1)
BETA PERCENT: 9 % (ref 11–19)
GAMMA GLOBULIN %: 16 % (ref 9–20)
GAMMA GLOBULIN: 1.2 G/DL (ref 0.5–1.5)
PATHOLOGIST: ABNORMAL
PATHOLOGIST: NORMAL
PROTEIN ELECTROPHORESIS, SERUM: ABNORMAL
SERUM IFX INTERP: NORMAL
TOTAL PROT. SUM,%: 101 % (ref 98–102)
TOTAL PROT. SUM: 7.8 G/DL (ref 6.3–8.2)
TOTAL PROTEIN: 7.7 G/DL (ref 6.4–8.3)

## 2022-07-28 RX ORDER — GABAPENTIN 300 MG/1
600 CAPSULE ORAL 3 TIMES DAILY
Qty: 180 CAPSULE | Refills: 0 | Status: SHIPPED | OUTPATIENT
Start: 2022-07-28 | End: 2022-08-18

## 2022-07-29 LAB
P E INTERPRETATION, U: NORMAL
PATHOLOGIST: NORMAL
SPECIMEN TYPE: NORMAL
URINE TOTAL PROTEIN: 4 MG/DL

## 2022-08-01 ENCOUNTER — TELEPHONE (OUTPATIENT)
Dept: NEUROLOGY | Age: 57
End: 2022-08-01

## 2022-08-01 NOTE — TELEPHONE ENCOUNTER
Erendira Agosto called in today. She said she had the EMG done and would like ot have Dr. Stevie Vieira review the results. She said Dr. Stevie Vieira and Dr. Mary Somers have been working together to try and figure out what is going on with her arm. Please advise ad we can reach her at [phone].

## 2022-09-17 DIAGNOSIS — M79.601 PARESTHESIA AND PAIN OF BOTH UPPER EXTREMITIES: ICD-10-CM

## 2022-09-17 DIAGNOSIS — M79.602 PARESTHESIA AND PAIN OF BOTH UPPER EXTREMITIES: ICD-10-CM

## 2022-09-17 DIAGNOSIS — R20.2 PARESTHESIA AND PAIN OF BOTH UPPER EXTREMITIES: ICD-10-CM

## 2022-09-19 RX ORDER — GABAPENTIN 300 MG/1
CAPSULE ORAL
Qty: 180 CAPSULE | Refills: 0 | Status: SHIPPED | OUTPATIENT
Start: 2022-09-19 | End: 2022-10-19

## 2022-09-19 NOTE — TELEPHONE ENCOUNTER
E-scribe requesting refill for gabapentin (NEURONTIN) 300 MG . Please review and e-scribe if applicable. Last Visit Date: 07/18/22    Next Visit Date:  No future appointments.

## 2022-10-18 DIAGNOSIS — M79.602 PARESTHESIA AND PAIN OF BOTH UPPER EXTREMITIES: ICD-10-CM

## 2022-10-18 DIAGNOSIS — M79.601 PARESTHESIA AND PAIN OF BOTH UPPER EXTREMITIES: ICD-10-CM

## 2022-10-18 DIAGNOSIS — R20.2 PARESTHESIA AND PAIN OF BOTH UPPER EXTREMITIES: ICD-10-CM

## 2022-10-19 RX ORDER — GABAPENTIN 300 MG/1
CAPSULE ORAL
Qty: 180 CAPSULE | Refills: 0 | Status: SHIPPED | OUTPATIENT
Start: 2022-10-19 | End: 2022-11-18

## 2022-11-14 ENCOUNTER — OFFICE VISIT (OUTPATIENT)
Dept: PAIN MANAGEMENT | Age: 57
End: 2022-11-14
Payer: COMMERCIAL

## 2022-11-14 VITALS — BODY MASS INDEX: 31.54 KG/M2 | WEIGHT: 178 LBS | HEIGHT: 63 IN

## 2022-11-14 DIAGNOSIS — M54.12 CERVICAL RADICULOPATHY: Primary | ICD-10-CM

## 2022-11-14 DIAGNOSIS — M47.812 CERVICAL SPONDYLOSIS: ICD-10-CM

## 2022-11-14 DIAGNOSIS — M96.1 POSTLAMINECTOMY SYNDROME OF CERVICAL REGION: ICD-10-CM

## 2022-11-14 PROCEDURE — 99214 OFFICE O/P EST MOD 30 MIN: CPT | Performed by: PAIN MEDICINE

## 2022-11-14 PROCEDURE — G8484 FLU IMMUNIZE NO ADMIN: HCPCS | Performed by: PAIN MEDICINE

## 2022-11-14 PROCEDURE — 1036F TOBACCO NON-USER: CPT | Performed by: PAIN MEDICINE

## 2022-11-14 PROCEDURE — G8427 DOCREV CUR MEDS BY ELIG CLIN: HCPCS | Performed by: PAIN MEDICINE

## 2022-11-14 PROCEDURE — 3017F COLORECTAL CA SCREEN DOC REV: CPT | Performed by: PAIN MEDICINE

## 2022-11-14 PROCEDURE — G8417 CALC BMI ABV UP PARAM F/U: HCPCS | Performed by: PAIN MEDICINE

## 2022-11-14 NOTE — PROGRESS NOTES
HPI:     Neck Pain   This is a chronic problem. The current episode started more than 1 year ago. The problem occurs intermittently. The problem has been unchanged. The pain is present in the midline. The quality of the pain is described as aching. The pain is moderate. The symptoms are aggravated by bending, coughing, sneezing, stress, position and twisting. The pain is Same all the time. Stiffness is present All day. She has tried bed rest, home exercises, ice, heat, NSAIDs and muscle relaxants for the symptoms. Had posterior C3-T1 fusion in [place] in August.  Notes reviewed. She is complaining of continued right arm pain. Patient denies any new neurological symptoms. Nobowel or bladder incontinence, no weakness, and no falling. Review of OARRS does not show any aberrant prescription behavior. 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[facility] [address]     Fibroid uterus     Graves disease     Menorrhagia        Past Surgical History:   Procedure Laterality Date     SECTION, LOW TRANSVERSE      HC  PICC POWERPIC SINGLE  2016         HYSTERECTOMY, TOTAL ABDOMINAL (CERVIX REMOVED)  6/28/10    TOTAL THYROIDECTOMY  2015       Allergies   Allergen Reactions    Codeine     Other      ALL NARCOTICS    Penicillin G Other (See Comments)         Current Outpatient Medications:     gabapentin (NEURONTIN) 300 MG capsule, TAKE 2 CAPSULES BY MOUTH IN THE MORNING AND TAKE 2 CAPSULES AT NOON AND TAKE 2 CAPSULES BEFORE BEDTIME.  DO ALL THIS FOR 30 DAYS., Disp: 180 capsule, Rfl: 0    estradiol (VIVELLE) 0.05 MG/24HR, , Disp: , Rfl:     NP THYROID 90 MG tablet, , Disp: , Rfl:     metoprolol succinate (TOPROL XL) 25 MG extended release tablet, , Disp: , Rfl:     CREON 6000-44688 units delayed release capsule, , Disp: , Rfl:     progesterone (PROMETRIUM) 100 MG CAPS capsule, Take by mouth daily, Disp: , Rfl:     Levothyroxine Sodium 125 MCG CAPS, Take by mouth Daily (Patient not taking: No sig reported), Disp: , Rfl:     liothyronine (CYTOMEL) 5 MCG tablet, Take 5 mcg by mouth daily (Patient not taking: No sig reported), Disp: , Rfl:     Multiple Vitamins-Minerals (MULTIVITAMIN PO), Take by mouth, Disp: , Rfl:     Ascorbic Acid (VITAMIN C) 500 MG tablet, Take 500 mg by mouth daily, Disp: , Rfl:     ALPRAZolam (XANAX) 0.5 MG tablet, Take 0.5 mg by mouth nightly as needed for Sleep (Patient not taking: No sig reported), Disp: , Rfl:     Family History   Problem Relation Age of Onset    Schizophrenia Mother     Heart Disease Father        Social History     Socioeconomic History    Marital status:      Spouse name: Not on file    Number of children: Not on file    Years of education: Not on file    Highest education level: Not on file   Occupational History    Not on file   Tobacco Use    Smoking status: Former     Packs/day: 1.00     Years: 20.00     Pack years: 20.00     Types: Cigarettes    Smokeless tobacco: Never   Substance and Sexual Activity    Alcohol use: Yes     Alcohol/week: 0.0 standard drinks     Comment: social    Drug use: No    Sexual activity: Yes   Other Topics Concern    Not on file   Social History Narrative    Not on file     Social Determinants of Health     Financial Resource Strain: Not on file   Food Insecurity: Not on file   Transportation Needs: Not on file   Physical Activity: Not on file   Stress: Not on file   Social Connections: Not on file   Intimate Partner Violence: Not on file   Housing Stability: Not on file       Review of Systems:  ROS    Physical Exam:  Ht 5' 3\" (1.6 m)   Wt 178 lb (80.7 kg)   BMI 31.53 kg/m²     Physical Exam  Constitutional:       Appearance: Normal appearance. Pulmonary:      Effort: Pulmonary effort is normal.   Neurological:      Mental Status: She is alert.    Psychiatric:         Attention and Perception: Attention and perception normal.         Mood and Affect: Mood and affect normal.       Record/Diagnostics Review:    As above, I did review the imaging      Assessment:  1. Cervical radiculopathy    2. Cervical spondylosis    3. Postlaminectomy syndrome of cervical region        Treatment Plan:  DISCUSSION: Treatment options discussed with patient and all questions answered to patient's satisfaction. OARRS Review: Reviewed and acceptable for medications prescribed. TREATMENT OPTIONS:     Discussed different treatment options including continued conservative care such as physical therapy, chiropractic care, acupuncture. Continue surgical follow-up. Here today wishing to continue her postoperative opioid regimen. I did discuss with her that would not be part of my plan for her at this time. Discussed continuing non-opioid medications such as Neurontin. She became upset and left the room. Cipriano Kessler M.D. I have reviewed the chief complaint and history of present illness (including ROS and PFSH) and vital documentation by my staff and I agree with their documentation and have added where applicable.

## 2023-03-16 ENCOUNTER — HOSPITAL ENCOUNTER (OUTPATIENT)
Dept: CT IMAGING | Facility: CLINIC | Age: 58
Discharge: HOME | End: 2023-03-18
Payer: COMMERCIAL

## 2023-03-16 DIAGNOSIS — M48.02 SPINAL STENOSIS IN CERVICAL REGION: ICD-10-CM

## 2023-03-16 PROCEDURE — 72125 CT NECK SPINE W/O DYE: CPT

## 2023-05-25 ENCOUNTER — HOSPITAL ENCOUNTER (EMERGENCY)
Age: 58
Discharge: HOME | End: 2023-05-25
Attending: EMERGENCY MEDICINE
Payer: COMMERCIAL

## 2023-05-25 VITALS
TEMPERATURE: 97.3 F | HEART RATE: 102 BPM | SYSTOLIC BLOOD PRESSURE: 139 MMHG | RESPIRATION RATE: 18 BRPM | OXYGEN SATURATION: 100 % | WEIGHT: 169 LBS | DIASTOLIC BLOOD PRESSURE: 93 MMHG | BODY MASS INDEX: 29.95 KG/M2 | HEIGHT: 63 IN

## 2023-05-25 DIAGNOSIS — F41.1 GENERALIZED ANXIETY DISORDER: Primary | ICD-10-CM

## 2023-05-25 PROCEDURE — 99282 EMERGENCY DEPT VISIT SF MDM: CPT

## 2023-05-25 ASSESSMENT — PAIN - FUNCTIONAL ASSESSMENT: PAIN_FUNCTIONAL_ASSESSMENT: NONE - DENIES PAIN

## 2023-05-25 NOTE — DISCHARGE INSTRUCTIONS
Please call the [facility] for evaluation and management. Call first thing tomorrow morning. Return for any increased anxiety, suicidal or homicidal thoughts, or any other worsening symptoms or concerns.

## 2023-05-25 NOTE — ED PROVIDER NOTES
[address]      Pt Name: Jody Pathak  MRN: [id number]  [place] 1965  Date of evaluation: 5/25/2023  Provider: TAMMIE Torres CNP    CHIEF COMPLAINT       Chief Complaint   Patient presents with    Anxiety         HISTORY OF PRESENT ILLNESS   (Location/Symptom, Timing/Onset, Context/Setting, Quality, Duration, Modifying Factors, Severity)  Note limiting factors. Jody Pathak is a 63 y.o. female who presents to the emergency department for evaluation of generalized anxiety. 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 Shortly after that she had her thyroid removed. She states that due to all of the pain she was drinking daily but she stopped drinking when she got neck surgery in August and has been sober for 9 months. She states that since then she has had increased anxiety. She has been trying to deal with this with her family doctor, she has tried multiple medications but is had poor side effects from all of them including Paxil, Prozac, Wellbutrin, BuSpar, Lexapro, Zoloft. She states that her doctor referred her to an endocrinologist thinking that may be that was causing her anxiety and also referred her to a psychiatrist but she cannot get in until July. She is here because she wants blood work to tell which antidepressant/antianxiety medication would work best with her DNA. She has been using Xanax and trazodone per her family doctor's recommendation but the trazodone makes her too jittery and has the opposite effect and she does not want to be addicted to Xanax. She denies any suicidal ideations she denies any homicidal ideations. She tells me that her mother was a schizophrenic, but she has no history of this.   She reports intrusive thoughts feeling that her family is in trouble, that she cannot let her friends

## 2023-05-27 NOTE — ED PROVIDER NOTES
I was present in the ED during this patient's evaluation and management by the Advance Practice Provider and was available to address any concerns about their medical management.     Vadim Rankin MD  Attending, Emergency Department       Wm Soares MD  05/27/23 [id number]